# Patient Record
Sex: MALE | Race: WHITE | NOT HISPANIC OR LATINO | Employment: OTHER | URBAN - METROPOLITAN AREA
[De-identification: names, ages, dates, MRNs, and addresses within clinical notes are randomized per-mention and may not be internally consistent; named-entity substitution may affect disease eponyms.]

---

## 2018-01-01 ENCOUNTER — APPOINTMENT (OUTPATIENT)
Dept: RADIOLOGY | Facility: MEDICAL CENTER | Age: 83
DRG: 987 | End: 2018-01-01
Attending: INTERNAL MEDICINE
Payer: MEDICARE

## 2018-01-01 ENCOUNTER — HOME CARE VISIT (OUTPATIENT)
Dept: HOSPICE | Facility: HOSPICE | Age: 83
End: 2018-01-01
Payer: MEDICARE

## 2018-01-01 ENCOUNTER — TELEPHONE (OUTPATIENT)
Dept: MEDICAL GROUP | Facility: MEDICAL CENTER | Age: 83
End: 2018-01-01

## 2018-01-01 ENCOUNTER — OFFICE VISIT (OUTPATIENT)
Dept: MEDICAL GROUP | Facility: MEDICAL CENTER | Age: 83
End: 2018-01-01
Payer: MEDICARE

## 2018-01-01 ENCOUNTER — APPOINTMENT (OUTPATIENT)
Dept: RADIOLOGY | Facility: MEDICAL CENTER | Age: 83
DRG: 987 | End: 2018-01-01
Attending: EMERGENCY MEDICINE
Payer: MEDICARE

## 2018-01-01 ENCOUNTER — APPOINTMENT (OUTPATIENT)
Dept: RADIOLOGY | Facility: MEDICAL CENTER | Age: 83
DRG: 987 | End: 2018-01-01
Attending: HOSPITALIST
Payer: MEDICARE

## 2018-01-01 ENCOUNTER — HOSPITAL ENCOUNTER (OUTPATIENT)
Dept: LAB | Facility: MEDICAL CENTER | Age: 83
End: 2018-01-02
Attending: NURSE PRACTITIONER
Payer: MEDICARE

## 2018-01-01 ENCOUNTER — PATIENT OUTREACH (OUTPATIENT)
Dept: HEALTH INFORMATION MANAGEMENT | Facility: OTHER | Age: 83
End: 2018-01-01

## 2018-01-01 ENCOUNTER — HOSPITAL ENCOUNTER (OUTPATIENT)
Dept: RADIATION ONCOLOGY | Facility: MEDICAL CENTER | Age: 83
End: 2018-02-28
Attending: RADIOLOGY
Payer: MEDICARE

## 2018-01-01 ENCOUNTER — HOSPICE ADMISSION (OUTPATIENT)
Dept: HOSPICE | Facility: HOSPICE | Age: 83
End: 2018-01-01
Payer: MEDICARE

## 2018-01-01 ENCOUNTER — HOSPITAL ENCOUNTER (OUTPATIENT)
Dept: LAB | Facility: MEDICAL CENTER | Age: 83
End: 2018-01-04
Attending: NURSE PRACTITIONER
Payer: MEDICARE

## 2018-01-01 ENCOUNTER — RESOLUTE PROFESSIONAL BILLING HOSPITAL PROF FEE (OUTPATIENT)
Dept: HOSPITALIST | Facility: MEDICAL CENTER | Age: 83
End: 2018-01-01
Payer: MEDICARE

## 2018-01-01 ENCOUNTER — HOSPITAL ENCOUNTER (INPATIENT)
Facility: MEDICAL CENTER | Age: 83
LOS: 12 days | DRG: 987 | End: 2018-02-06
Attending: EMERGENCY MEDICINE | Admitting: HOSPITALIST
Payer: MEDICARE

## 2018-01-01 VITALS
SYSTOLIC BLOOD PRESSURE: 115 MMHG | BODY MASS INDEX: 24.97 KG/M2 | WEIGHT: 215.83 LBS | RESPIRATION RATE: 18 BRPM | TEMPERATURE: 97.4 F | HEIGHT: 78 IN | OXYGEN SATURATION: 92 % | DIASTOLIC BLOOD PRESSURE: 51 MMHG | HEART RATE: 45 BPM

## 2018-01-01 VITALS
SYSTOLIC BLOOD PRESSURE: 102 MMHG | DIASTOLIC BLOOD PRESSURE: 60 MMHG | RESPIRATION RATE: 40 BRPM | HEART RATE: 60 BPM | OXYGEN SATURATION: 96 %

## 2018-01-01 VITALS
DIASTOLIC BLOOD PRESSURE: 60 MMHG | HEART RATE: 60 BPM | SYSTOLIC BLOOD PRESSURE: 102 MMHG | RESPIRATION RATE: 20 BRPM | OXYGEN SATURATION: 96 %

## 2018-01-01 VITALS
SYSTOLIC BLOOD PRESSURE: 140 MMHG | OXYGEN SATURATION: 93 % | HEIGHT: 77 IN | BODY MASS INDEX: 21.8 KG/M2 | TEMPERATURE: 100.2 F | DIASTOLIC BLOOD PRESSURE: 84 MMHG | WEIGHT: 184.6 LBS | RESPIRATION RATE: 44 BRPM | HEART RATE: 86 BPM

## 2018-01-01 VITALS
OXYGEN SATURATION: 94 % | TEMPERATURE: 98.7 F | BODY MASS INDEX: 22.79 KG/M2 | DIASTOLIC BLOOD PRESSURE: 74 MMHG | HEART RATE: 86 BPM | RESPIRATION RATE: 16 BRPM | SYSTOLIC BLOOD PRESSURE: 122 MMHG | WEIGHT: 193 LBS | HEIGHT: 77 IN

## 2018-01-01 VITALS
BODY MASS INDEX: 24.85 KG/M2 | RESPIRATION RATE: 24 BRPM | HEART RATE: 53 BPM | WEIGHT: 215 LBS | OXYGEN SATURATION: 79 %

## 2018-01-01 VITALS — RESPIRATION RATE: 7 BRPM | HEART RATE: 80 BPM

## 2018-01-01 DIAGNOSIS — I31.39 PERICARDIAL EFFUSION: ICD-10-CM

## 2018-01-01 DIAGNOSIS — R63.0 POOR APPETITE: ICD-10-CM

## 2018-01-01 DIAGNOSIS — Z87.440 HISTORY OF UTI: ICD-10-CM

## 2018-01-01 DIAGNOSIS — G95.20 CORD COMPRESSION (HCC): ICD-10-CM

## 2018-01-01 DIAGNOSIS — R33.9 RETENTION OF URINE: ICD-10-CM

## 2018-01-01 DIAGNOSIS — R05.9 COUGH: ICD-10-CM

## 2018-01-01 DIAGNOSIS — R63.4 WEIGHT LOSS, UNINTENTIONAL: ICD-10-CM

## 2018-01-01 DIAGNOSIS — R68.89 COLD INTOLERANCE: ICD-10-CM

## 2018-01-01 DIAGNOSIS — Z91.81 HISTORY OF RECENT FALL: ICD-10-CM

## 2018-01-01 DIAGNOSIS — C79.51 METASTATIC ADENOCARCINOMA TO BONE (HCC): ICD-10-CM

## 2018-01-01 DIAGNOSIS — I26.99 OTHER ACUTE PULMONARY EMBOLISM WITHOUT ACUTE COR PULMONALE (HCC): ICD-10-CM

## 2018-01-01 DIAGNOSIS — R62.7 ADULT FAILURE TO THRIVE: ICD-10-CM

## 2018-01-01 DIAGNOSIS — R09.89 POOR PERIPHERAL CIRCULATION: ICD-10-CM

## 2018-01-01 DIAGNOSIS — R06.02 SHORTNESS OF BREATH: ICD-10-CM

## 2018-01-01 DIAGNOSIS — Z91.81 HISTORY OF FALL: ICD-10-CM

## 2018-01-01 DIAGNOSIS — R53.83 FATIGUE, UNSPECIFIED TYPE: ICD-10-CM

## 2018-01-01 DIAGNOSIS — R07.82 INTERCOSTAL PAIN: ICD-10-CM

## 2018-01-01 LAB
ALBUMIN 24H UR QL ELPH: DETECTED
ALBUMIN SERPL BCP-MCNC: 2.5 G/DL (ref 3.2–4.9)
ALBUMIN SERPL BCP-MCNC: 2.5 G/DL (ref 3.2–4.9)
ALBUMIN SERPL BCP-MCNC: 2.6 G/DL (ref 3.2–4.9)
ALBUMIN SERPL BCP-MCNC: 2.7 G/DL (ref 3.2–4.9)
ALBUMIN SERPL BCP-MCNC: 2.9 G/DL (ref 3.2–4.9)
ALBUMIN SERPL BCP-MCNC: 3 G/DL (ref 3.2–4.9)
ALBUMIN SERPL BCP-MCNC: 3.6 G/DL (ref 3.2–4.9)
ALBUMIN SERPL-MCNC: 2.68 G/DL (ref 3.75–5.01)
ALBUMIN/GLOB SERPL: 0.8 G/DL
ALBUMIN/GLOB SERPL: 0.9 G/DL
ALBUMIN/GLOB SERPL: 0.9 G/DL
ALBUMIN/GLOB SERPL: 1 G/DL
ALP SERPL-CCNC: 103 U/L (ref 30–99)
ALP SERPL-CCNC: 74 U/L (ref 30–99)
ALP SERPL-CCNC: 81 U/L (ref 30–99)
ALP SERPL-CCNC: 82 U/L (ref 30–99)
ALP SERPL-CCNC: 89 U/L (ref 30–99)
ALP SERPL-CCNC: 90 U/L (ref 30–99)
ALP SERPL-CCNC: 99 U/L (ref 30–99)
ALPHA1 GLOB 24H UR QL ELPH: DETECTED
ALPHA1 GLOB SERPL ELPH-MCNC: 0.57 G/DL (ref 0.19–0.46)
ALPHA2 GLOB 24H UR QL ELPH: DETECTED
ALPHA2 GLOB SERPL ELPH-MCNC: 1.02 G/DL (ref 0.48–1.05)
ALT SERPL-CCNC: 10 U/L (ref 2–50)
ALT SERPL-CCNC: 13 U/L (ref 2–50)
ALT SERPL-CCNC: 17 U/L (ref 2–50)
ALT SERPL-CCNC: 19 U/L (ref 2–50)
ALT SERPL-CCNC: 20 U/L (ref 2–50)
ALT SERPL-CCNC: 23 U/L (ref 2–50)
ALT SERPL-CCNC: 38 U/L (ref 2–50)
ANION GAP SERPL CALC-SCNC: 10 MMOL/L (ref 0–11.9)
ANION GAP SERPL CALC-SCNC: 11 MMOL/L (ref 0–11.9)
ANION GAP SERPL CALC-SCNC: 11 MMOL/L (ref 0–11.9)
ANION GAP SERPL CALC-SCNC: 6 MMOL/L (ref 0–11.9)
ANION GAP SERPL CALC-SCNC: 7 MMOL/L (ref 0–11.9)
ANION GAP SERPL CALC-SCNC: 8 MMOL/L (ref 0–11.9)
ANION GAP SERPL CALC-SCNC: 9 MMOL/L (ref 0–11.9)
APPEARANCE UR: CLEAR
APPEARANCE UR: CLEAR
APTT PPP: 101.8 SEC (ref 24.7–36)
APTT PPP: 105.7 SEC (ref 24.7–36)
APTT PPP: 114.6 SEC (ref 24.7–36)
APTT PPP: 41.1 SEC (ref 24.7–36)
APTT PPP: 49 SEC (ref 24.7–36)
APTT PPP: 50.7 SEC (ref 24.7–36)
APTT PPP: 52.8 SEC (ref 24.7–36)
APTT PPP: 55.4 SEC (ref 24.7–36)
APTT PPP: 65.3 SEC (ref 24.7–36)
APTT PPP: 70.5 SEC (ref 24.7–36)
APTT PPP: 70.7 SEC (ref 24.7–36)
APTT PPP: 77.3 SEC (ref 24.7–36)
APTT PPP: 78 SEC (ref 24.7–36)
APTT PPP: 80.2 SEC (ref 24.7–36)
APTT PPP: 83.6 SEC (ref 24.7–36)
APTT PPP: 91.4 SEC (ref 24.7–36)
APTT PPP: 97.8 SEC (ref 24.7–36)
AST SERPL-CCNC: 15 U/L (ref 12–45)
AST SERPL-CCNC: 20 U/L (ref 12–45)
AST SERPL-CCNC: 20 U/L (ref 12–45)
AST SERPL-CCNC: 27 U/L (ref 12–45)
AST SERPL-CCNC: 28 U/L (ref 12–45)
AST SERPL-CCNC: 29 U/L (ref 12–45)
AST SERPL-CCNC: 29 U/L (ref 12–45)
B-GLOBULIN SERPL ELPH-MCNC: 0.9 G/DL (ref 0.48–1.1)
B-GLOBULIN UR QL ELPH: DETECTED
BACTERIA #/AREA URNS HPF: ABNORMAL /HPF
BACTERIA #/AREA URNS HPF: NEGATIVE /HPF
BACTERIA BLD CULT: NORMAL
BACTERIA BLD CULT: NORMAL
BACTERIA UR CULT: ABNORMAL
BACTERIA UR CULT: ABNORMAL
BACTERIA UR CULT: NORMAL
BASOPHILS # BLD AUTO: 0.1 % (ref 0–1.8)
BASOPHILS # BLD AUTO: 0.1 % (ref 0–1.8)
BASOPHILS # BLD AUTO: 0.2 % (ref 0–1.8)
BASOPHILS # BLD AUTO: 0.2 % (ref 0–1.8)
BASOPHILS # BLD AUTO: 0.3 % (ref 0–1.8)
BASOPHILS # BLD AUTO: 0.3 % (ref 0–1.8)
BASOPHILS # BLD AUTO: 0.4 % (ref 0–1.8)
BASOPHILS # BLD AUTO: 0.6 % (ref 0–1.8)
BASOPHILS # BLD: 0.01 K/UL (ref 0–0.12)
BASOPHILS # BLD: 0.02 K/UL (ref 0–0.12)
BASOPHILS # BLD: 0.03 K/UL (ref 0–0.12)
BASOPHILS # BLD: 0.04 K/UL (ref 0–0.12)
BASOPHILS # BLD: 0.04 K/UL (ref 0–0.12)
BASOPHILS # BLD: 0.05 K/UL (ref 0–0.12)
BASOPHILS # BLD: 0.05 K/UL (ref 0–0.12)
BASOPHILS # BLD: 0.07 K/UL (ref 0–0.12)
BILIRUB SERPL-MCNC: 0.5 MG/DL (ref 0.1–1.5)
BILIRUB SERPL-MCNC: 0.5 MG/DL (ref 0.1–1.5)
BILIRUB SERPL-MCNC: 0.6 MG/DL (ref 0.1–1.5)
BILIRUB SERPL-MCNC: 0.6 MG/DL (ref 0.1–1.5)
BILIRUB SERPL-MCNC: 0.7 MG/DL (ref 0.1–1.5)
BILIRUB SERPL-MCNC: 0.9 MG/DL (ref 0.1–1.5)
BILIRUB SERPL-MCNC: 0.9 MG/DL (ref 0.1–1.5)
BILIRUB UR QL STRIP.AUTO: NEGATIVE
BILIRUB UR QL STRIP.AUTO: NEGATIVE
BNP SERPL-MCNC: 350 PG/ML (ref 0–100)
BUN SERPL-MCNC: 18 MG/DL (ref 8–22)
BUN SERPL-MCNC: 19 MG/DL (ref 8–22)
BUN SERPL-MCNC: 25 MG/DL (ref 8–22)
BUN SERPL-MCNC: 26 MG/DL (ref 8–22)
BUN SERPL-MCNC: 29 MG/DL (ref 8–22)
BUN SERPL-MCNC: 31 MG/DL (ref 8–22)
BUN SERPL-MCNC: 34 MG/DL (ref 8–22)
BUN SERPL-MCNC: 37 MG/DL (ref 8–22)
BUN SERPL-MCNC: 40 MG/DL (ref 8–22)
CALCIUM SERPL-MCNC: 7.5 MG/DL (ref 8.5–10.5)
CALCIUM SERPL-MCNC: 7.8 MG/DL (ref 8.5–10.5)
CALCIUM SERPL-MCNC: 8 MG/DL (ref 8.5–10.5)
CALCIUM SERPL-MCNC: 8.3 MG/DL (ref 8.5–10.5)
CALCIUM SERPL-MCNC: 8.3 MG/DL (ref 8.5–10.5)
CALCIUM SERPL-MCNC: 8.6 MG/DL (ref 8.5–10.5)
CALCIUM SERPL-MCNC: 8.6 MG/DL (ref 8.5–10.5)
CALCIUM SERPL-MCNC: 9 MG/DL (ref 8.5–10.5)
CALCIUM SERPL-MCNC: 9.2 MG/DL (ref 8.5–10.5)
CHLORIDE SERPL-SCNC: 103 MMOL/L (ref 96–112)
CHLORIDE SERPL-SCNC: 105 MMOL/L (ref 96–112)
CHLORIDE SERPL-SCNC: 106 MMOL/L (ref 96–112)
CHLORIDE SERPL-SCNC: 106 MMOL/L (ref 96–112)
CHLORIDE SERPL-SCNC: 107 MMOL/L (ref 96–112)
CHLORIDE SERPL-SCNC: 110 MMOL/L (ref 96–112)
CHOLEST SERPL-MCNC: 101 MG/DL (ref 100–199)
CO2 SERPL-SCNC: 20 MMOL/L (ref 20–33)
CO2 SERPL-SCNC: 22 MMOL/L (ref 20–33)
CO2 SERPL-SCNC: 24 MMOL/L (ref 20–33)
CO2 SERPL-SCNC: 25 MMOL/L (ref 20–33)
COLLECT DURATION TIME SPEC: ABNORMAL H
COLOR UR: ABNORMAL
COLOR UR: YELLOW
CREAT SERPL-MCNC: 0.86 MG/DL (ref 0.5–1.4)
CREAT SERPL-MCNC: 0.88 MG/DL (ref 0.5–1.4)
CREAT SERPL-MCNC: 0.95 MG/DL (ref 0.5–1.4)
CREAT SERPL-MCNC: 0.99 MG/DL (ref 0.5–1.4)
CREAT SERPL-MCNC: 1.07 MG/DL (ref 0.5–1.4)
CREAT SERPL-MCNC: 1.15 MG/DL (ref 0.5–1.4)
CREAT SERPL-MCNC: 1.19 MG/DL (ref 0.5–1.4)
CREAT SERPL-MCNC: 1.56 MG/DL (ref 0.5–1.4)
CREAT SERPL-MCNC: 1.62 MG/DL (ref 0.5–1.4)
CRP SERPL HS-MCNC: 12 MG/DL (ref 0–0.75)
CULTURE IF INDICATED INDCX: YES UA CULTURE
EKG IMPRESSION: NORMAL
EOSINOPHIL # BLD AUTO: 0 K/UL (ref 0–0.51)
EOSINOPHIL # BLD AUTO: 0.01 K/UL (ref 0–0.51)
EOSINOPHIL # BLD AUTO: 0.02 K/UL (ref 0–0.51)
EOSINOPHIL # BLD AUTO: 0.17 K/UL (ref 0–0.51)
EOSINOPHIL # BLD AUTO: 0.3 K/UL (ref 0–0.51)
EOSINOPHIL # BLD AUTO: 0.34 K/UL (ref 0–0.51)
EOSINOPHIL NFR BLD: 0 % (ref 0–6.9)
EOSINOPHIL NFR BLD: 0.1 % (ref 0–6.9)
EOSINOPHIL NFR BLD: 0.1 % (ref 0–6.9)
EOSINOPHIL NFR BLD: 1.2 % (ref 0–6.9)
EOSINOPHIL NFR BLD: 2.5 % (ref 0–6.9)
EOSINOPHIL NFR BLD: 2.9 % (ref 0–6.9)
EPI CELLS #/AREA URNS HPF: ABNORMAL /HPF
EPI CELLS #/AREA URNS HPF: ABNORMAL /HPF
ERYTHROCYTE [DISTWIDTH] IN BLOOD BY AUTOMATED COUNT: 46.8 FL (ref 35.9–50)
ERYTHROCYTE [DISTWIDTH] IN BLOOD BY AUTOMATED COUNT: 47.8 FL (ref 35.9–50)
ERYTHROCYTE [DISTWIDTH] IN BLOOD BY AUTOMATED COUNT: 47.9 FL (ref 35.9–50)
ERYTHROCYTE [DISTWIDTH] IN BLOOD BY AUTOMATED COUNT: 48 FL (ref 35.9–50)
ERYTHROCYTE [DISTWIDTH] IN BLOOD BY AUTOMATED COUNT: 48.3 FL (ref 35.9–50)
ERYTHROCYTE [DISTWIDTH] IN BLOOD BY AUTOMATED COUNT: 48.3 FL (ref 35.9–50)
ERYTHROCYTE [DISTWIDTH] IN BLOOD BY AUTOMATED COUNT: 48.7 FL (ref 35.9–50)
ERYTHROCYTE [DISTWIDTH] IN BLOOD BY AUTOMATED COUNT: 49.5 FL (ref 35.9–50)
ERYTHROCYTE [SEDIMENTATION RATE] IN BLOOD BY WESTERGREN METHOD: 34 MM/HOUR (ref 0–20)
ERYTHROCYTE [SEDIMENTATION RATE] IN BLOOD BY WESTERGREN METHOD: 38 MM/HOUR (ref 0–20)
FLUAV RNA SPEC QL NAA+PROBE: NEGATIVE
FLUBV RNA SPEC QL NAA+PROBE: NEGATIVE
FOLATE SERPL-MCNC: 11.7 NG/ML
GAMMA GLOB SERPL ELPH-MCNC: 1.13 G/DL (ref 0.62–1.51)
GAMMA GLOB UR QL ELPH: DETECTED
GFR SERPL CREATININE-BSD FRML MDRD: 40 ML/MIN/1.73 M 2
GLOBULIN SER CALC-MCNC: 2.6 G/DL (ref 1.9–3.5)
GLOBULIN SER CALC-MCNC: 3.2 G/DL (ref 1.9–3.5)
GLOBULIN SER CALC-MCNC: 3.2 G/DL (ref 1.9–3.5)
GLOBULIN SER CALC-MCNC: 3.3 G/DL (ref 1.9–3.5)
GLOBULIN SER CALC-MCNC: 3.3 G/DL (ref 1.9–3.5)
GLOBULIN SER CALC-MCNC: 3.6 G/DL (ref 1.9–3.5)
GLOBULIN SER CALC-MCNC: 3.8 G/DL (ref 1.9–3.5)
GLUCOSE SERPL-MCNC: 103 MG/DL (ref 65–99)
GLUCOSE SERPL-MCNC: 106 MG/DL (ref 65–99)
GLUCOSE SERPL-MCNC: 106 MG/DL (ref 65–99)
GLUCOSE SERPL-MCNC: 118 MG/DL (ref 65–99)
GLUCOSE SERPL-MCNC: 124 MG/DL (ref 65–99)
GLUCOSE SERPL-MCNC: 132 MG/DL (ref 65–99)
GLUCOSE SERPL-MCNC: 135 MG/DL (ref 65–99)
GLUCOSE SERPL-MCNC: 96 MG/DL (ref 65–99)
GLUCOSE SERPL-MCNC: 97 MG/DL (ref 65–99)
GLUCOSE UR STRIP.AUTO-MCNC: NEGATIVE MG/DL
GLUCOSE UR STRIP.AUTO-MCNC: NEGATIVE MG/DL
HCT VFR BLD AUTO: 37.3 % (ref 42–52)
HCT VFR BLD AUTO: 40.3 % (ref 42–52)
HCT VFR BLD AUTO: 40.4 % (ref 42–52)
HCT VFR BLD AUTO: 40.8 % (ref 42–52)
HCT VFR BLD AUTO: 40.8 % (ref 42–52)
HCT VFR BLD AUTO: 41.1 % (ref 42–52)
HCT VFR BLD AUTO: 41.2 % (ref 42–52)
HCT VFR BLD AUTO: 42.8 % (ref 42–52)
HCT VFR BLD AUTO: 44.1 % (ref 42–52)
HCT VFR BLD AUTO: 45.4 % (ref 42–52)
HDLC SERPL-MCNC: 42 MG/DL
HGB BLD-MCNC: 12.5 G/DL (ref 14–18)
HGB BLD-MCNC: 12.9 G/DL (ref 14–18)
HGB BLD-MCNC: 13.1 G/DL (ref 14–18)
HGB BLD-MCNC: 13.1 G/DL (ref 14–18)
HGB BLD-MCNC: 13.3 G/DL (ref 14–18)
HGB BLD-MCNC: 13.4 G/DL (ref 14–18)
HGB BLD-MCNC: 13.8 G/DL (ref 14–18)
HGB BLD-MCNC: 14 G/DL (ref 14–18)
HGB BLD-MCNC: 14.5 G/DL (ref 14–18)
HGB BLD-MCNC: 15.3 G/DL (ref 14–18)
HYALINE CASTS #/AREA URNS LPF: ABNORMAL /LPF
HYALINE CASTS #/AREA URNS LPF: ABNORMAL /LPF
IMM GRANULOCYTES # BLD AUTO: 0.06 K/UL (ref 0–0.11)
IMM GRANULOCYTES # BLD AUTO: 0.08 K/UL (ref 0–0.11)
IMM GRANULOCYTES # BLD AUTO: 0.08 K/UL (ref 0–0.11)
IMM GRANULOCYTES # BLD AUTO: 0.1 K/UL (ref 0–0.11)
IMM GRANULOCYTES # BLD AUTO: 0.1 K/UL (ref 0–0.11)
IMM GRANULOCYTES # BLD AUTO: 0.11 K/UL (ref 0–0.11)
IMM GRANULOCYTES # BLD AUTO: 0.15 K/UL (ref 0–0.11)
IMM GRANULOCYTES # BLD AUTO: 0.3 K/UL (ref 0–0.11)
IMM GRANULOCYTES NFR BLD AUTO: 0.5 % (ref 0–0.9)
IMM GRANULOCYTES NFR BLD AUTO: 0.6 % (ref 0–0.9)
IMM GRANULOCYTES NFR BLD AUTO: 0.7 % (ref 0–0.9)
IMM GRANULOCYTES NFR BLD AUTO: 0.7 % (ref 0–0.9)
IMM GRANULOCYTES NFR BLD AUTO: 0.8 % (ref 0–0.9)
IMM GRANULOCYTES NFR BLD AUTO: 1.8 % (ref 0–0.9)
INR PPP: 1.2 (ref 0.87–1.13)
INTERPRETATION SERPL IFE-IMP: ABNORMAL
INTERPRETATION UR IFE-IMP: ABNORMAL
KAPPA LC FREE UR-MCNC: 56.2 MG/DL (ref 0.14–2.42)
KAPPA LC FREE/LAMBDA FREE UR: 11.54 RATIO (ref 2.04–10.37)
KETONES UR STRIP.AUTO-MCNC: ABNORMAL MG/DL
KETONES UR STRIP.AUTO-MCNC: NEGATIVE MG/DL
LACTATE BLD-SCNC: 1.5 MMOL/L (ref 0.5–2)
LACTATE BLD-SCNC: 1.9 MMOL/L (ref 0.5–2)
LAMBDA LC FREE UR-MCNC: 4.87 MG/DL (ref 0.02–0.67)
LDLC SERPL CALC-MCNC: 48 MG/DL
LEUKOCYTE ESTERASE UR QL STRIP.AUTO: ABNORMAL
LEUKOCYTE ESTERASE UR QL STRIP.AUTO: ABNORMAL
LV EJECT FRACT  99904: 50
LV EJECT FRACT MOD 2C 99903: 54.74
LV EJECT FRACT MOD 4C 99902: 46.1
LV EJECT FRACT MOD BP 99901: 48.35
LYMPHOCYTES # BLD AUTO: 0.28 K/UL (ref 1–4.8)
LYMPHOCYTES # BLD AUTO: 0.43 K/UL (ref 1–4.8)
LYMPHOCYTES # BLD AUTO: 0.65 K/UL (ref 1–4.8)
LYMPHOCYTES # BLD AUTO: 0.87 K/UL (ref 1–4.8)
LYMPHOCYTES # BLD AUTO: 0.89 K/UL (ref 1–4.8)
LYMPHOCYTES # BLD AUTO: 1.21 K/UL (ref 1–4.8)
LYMPHOCYTES # BLD AUTO: 1.25 K/UL (ref 1–4.8)
LYMPHOCYTES # BLD AUTO: 1.92 K/UL (ref 1–4.8)
LYMPHOCYTES NFR BLD: 1.7 % (ref 22–41)
LYMPHOCYTES NFR BLD: 10 % (ref 22–41)
LYMPHOCYTES NFR BLD: 10.8 % (ref 22–41)
LYMPHOCYTES NFR BLD: 13.6 % (ref 22–41)
LYMPHOCYTES NFR BLD: 2.3 % (ref 22–41)
LYMPHOCYTES NFR BLD: 5.3 % (ref 22–41)
LYMPHOCYTES NFR BLD: 6.6 % (ref 22–41)
LYMPHOCYTES NFR BLD: 6.7 % (ref 22–41)
MAGNESIUM SERPL-MCNC: 2 MG/DL (ref 1.5–2.5)
MAGNESIUM SERPL-MCNC: 2.1 MG/DL (ref 1.5–2.5)
MAGNESIUM SERPL-MCNC: 2.2 MG/DL (ref 1.5–2.5)
MCH RBC QN AUTO: 29 PG (ref 27–33)
MCH RBC QN AUTO: 29.1 PG (ref 27–33)
MCH RBC QN AUTO: 29.2 PG (ref 27–33)
MCH RBC QN AUTO: 29.3 PG (ref 27–33)
MCH RBC QN AUTO: 29.5 PG (ref 27–33)
MCH RBC QN AUTO: 29.9 PG (ref 27–33)
MCH RBC QN AUTO: 30 PG (ref 27–33)
MCH RBC QN AUTO: 30 PG (ref 27–33)
MCH RBC QN AUTO: 30.5 PG (ref 27–33)
MCH RBC QN AUTO: 30.6 PG (ref 27–33)
MCHC RBC AUTO-ENTMCNC: 31.9 G/DL (ref 33.7–35.3)
MCHC RBC AUTO-ENTMCNC: 31.9 G/DL (ref 33.7–35.3)
MCHC RBC AUTO-ENTMCNC: 32.1 G/DL (ref 33.7–35.3)
MCHC RBC AUTO-ENTMCNC: 32.3 G/DL (ref 33.7–35.3)
MCHC RBC AUTO-ENTMCNC: 32.7 G/DL (ref 33.7–35.3)
MCHC RBC AUTO-ENTMCNC: 32.9 G/DL (ref 33.7–35.3)
MCHC RBC AUTO-ENTMCNC: 33.3 G/DL (ref 33.7–35.3)
MCHC RBC AUTO-ENTMCNC: 33.5 G/DL (ref 33.7–35.3)
MCHC RBC AUTO-ENTMCNC: 33.7 G/DL (ref 33.7–35.3)
MCHC RBC AUTO-ENTMCNC: 33.8 G/DL (ref 33.7–35.3)
MCV RBC AUTO: 89 FL (ref 81.4–97.8)
MCV RBC AUTO: 89.7 FL (ref 81.4–97.8)
MCV RBC AUTO: 90 FL (ref 81.4–97.8)
MCV RBC AUTO: 90.1 FL (ref 81.4–97.8)
MCV RBC AUTO: 90.3 FL (ref 81.4–97.8)
MCV RBC AUTO: 90.4 FL (ref 81.4–97.8)
MCV RBC AUTO: 90.9 FL (ref 81.4–97.8)
MCV RBC AUTO: 91 FL (ref 81.4–97.8)
MCV RBC AUTO: 91.3 FL (ref 81.4–97.8)
MCV RBC AUTO: 93 FL (ref 81.4–97.8)
MICRO URNS: ABNORMAL
MICRO URNS: ABNORMAL
MONOCLON BAND OBS SERPL: ABNORMAL
MONOCYTES # BLD AUTO: 0.23 K/UL (ref 0–0.85)
MONOCYTES # BLD AUTO: 0.4 K/UL (ref 0–0.85)
MONOCYTES # BLD AUTO: 0.59 K/UL (ref 0–0.85)
MONOCYTES # BLD AUTO: 1.23 K/UL (ref 0–0.85)
MONOCYTES # BLD AUTO: 1.36 K/UL (ref 0–0.85)
MONOCYTES # BLD AUTO: 1.4 K/UL (ref 0–0.85)
MONOCYTES # BLD AUTO: 1.58 K/UL (ref 0–0.85)
MONOCYTES # BLD AUTO: 1.61 K/UL (ref 0–0.85)
MONOCYTES NFR BLD AUTO: 10.4 % (ref 0–13.4)
MONOCYTES NFR BLD AUTO: 10.6 % (ref 0–13.4)
MONOCYTES NFR BLD AUTO: 11.2 % (ref 0–13.4)
MONOCYTES NFR BLD AUTO: 11.4 % (ref 0–13.4)
MONOCYTES NFR BLD AUTO: 2.2 % (ref 0–13.4)
MONOCYTES NFR BLD AUTO: 2.4 % (ref 0–13.4)
MONOCYTES NFR BLD AUTO: 3.6 % (ref 0–13.4)
MONOCYTES NFR BLD AUTO: 9.5 % (ref 0–13.4)
NEUTROPHILS # BLD AUTO: 10.23 K/UL (ref 1.82–7.42)
NEUTROPHILS # BLD AUTO: 11.06 K/UL (ref 1.82–7.42)
NEUTROPHILS # BLD AUTO: 13.94 K/UL (ref 1.82–7.42)
NEUTROPHILS # BLD AUTO: 15.36 K/UL (ref 1.82–7.42)
NEUTROPHILS # BLD AUTO: 17.29 K/UL (ref 1.82–7.42)
NEUTROPHILS # BLD AUTO: 8.63 K/UL (ref 1.82–7.42)
NEUTROPHILS # BLD AUTO: 8.66 K/UL (ref 1.82–7.42)
NEUTROPHILS # BLD AUTO: 9.1 K/UL (ref 1.82–7.42)
NEUTROPHILS NFR BLD: 72.8 % (ref 44–72)
NEUTROPHILS NFR BLD: 74.8 % (ref 44–72)
NEUTROPHILS NFR BLD: 75 % (ref 44–72)
NEUTROPHILS NFR BLD: 82 % (ref 44–72)
NEUTROPHILS NFR BLD: 84.2 % (ref 44–72)
NEUTROPHILS NFR BLD: 90 % (ref 44–72)
NEUTROPHILS NFR BLD: 92.7 % (ref 44–72)
NEUTROPHILS NFR BLD: 94.5 % (ref 44–72)
NITRITE UR QL STRIP.AUTO: POSITIVE
NITRITE UR QL STRIP.AUTO: POSITIVE
NRBC # BLD AUTO: 0 K/UL
NRBC BLD-RTO: 0 /100 WBC
PATHOLOGY STUDY: ABNORMAL
PH UR STRIP.AUTO: 5 [PH]
PH UR STRIP.AUTO: 5 [PH]
PHOSPHATE SERPL-MCNC: 2.8 MG/DL (ref 2.5–4.5)
PHOSPHATE SERPL-MCNC: 3.1 MG/DL (ref 2.5–4.5)
PHOSPHATE SERPL-MCNC: 3.3 MG/DL (ref 2.5–4.5)
PHOSPHATE SERPL-MCNC: 4.1 MG/DL (ref 2.5–4.5)
PLATELET # BLD AUTO: 225 K/UL (ref 164–446)
PLATELET # BLD AUTO: 256 K/UL (ref 164–446)
PLATELET # BLD AUTO: 262 K/UL (ref 164–446)
PLATELET # BLD AUTO: 285 K/UL (ref 164–446)
PLATELET # BLD AUTO: 296 K/UL (ref 164–446)
PLATELET # BLD AUTO: 298 K/UL (ref 164–446)
PLATELET # BLD AUTO: 299 K/UL (ref 164–446)
PLATELET # BLD AUTO: 311 K/UL (ref 164–446)
PLATELET # BLD AUTO: 366 K/UL (ref 164–446)
PLATELET # BLD AUTO: 369 K/UL (ref 164–446)
PMV BLD AUTO: 10 FL (ref 9–12.9)
PMV BLD AUTO: 10 FL (ref 9–12.9)
PMV BLD AUTO: 10.2 FL (ref 9–12.9)
PMV BLD AUTO: 10.4 FL (ref 9–12.9)
PMV BLD AUTO: 10.5 FL (ref 9–12.9)
PMV BLD AUTO: 11.1 FL (ref 9–12.9)
PMV BLD AUTO: 9.9 FL (ref 9–12.9)
PMV BLD AUTO: 9.9 FL (ref 9–12.9)
POTASSIUM SERPL-SCNC: 3.7 MMOL/L (ref 3.6–5.5)
POTASSIUM SERPL-SCNC: 3.7 MMOL/L (ref 3.6–5.5)
POTASSIUM SERPL-SCNC: 3.9 MMOL/L (ref 3.6–5.5)
POTASSIUM SERPL-SCNC: 3.9 MMOL/L (ref 3.6–5.5)
POTASSIUM SERPL-SCNC: 4 MMOL/L (ref 3.6–5.5)
POTASSIUM SERPL-SCNC: 4.3 MMOL/L (ref 3.6–5.5)
PROCALCITONIN SERPL-MCNC: 0.13 NG/ML
PROT 24H UR-MRATE: ABNORMAL MG/D (ref 10–140)
PROT SERPL-MCNC: 5.3 G/DL (ref 6–8.2)
PROT SERPL-MCNC: 5.7 G/DL (ref 6–8.2)
PROT SERPL-MCNC: 5.8 G/DL (ref 6–8.2)
PROT SERPL-MCNC: 5.9 G/DL (ref 6–8.2)
PROT SERPL-MCNC: 6.1 G/DL (ref 6–8.2)
PROT SERPL-MCNC: 6.3 G/DL (ref 6–8.3)
PROT SERPL-MCNC: 6.6 G/DL (ref 6–8.2)
PROT SERPL-MCNC: 7.4 G/DL (ref 6–8.2)
PROT UR QL STRIP: 30 MG/DL
PROT UR QL STRIP: NEGATIVE MG/DL
PROTHROMBIN TIME: 14.9 SEC (ref 12–14.6)
PSA SERPL-MCNC: 0.31 NG/ML (ref 0–4)
RBC # BLD AUTO: 4.16 M/UL (ref 4.7–6.1)
RBC # BLD AUTO: 4.44 M/UL (ref 4.7–6.1)
RBC # BLD AUTO: 4.47 M/UL (ref 4.7–6.1)
RBC # BLD AUTO: 4.48 M/UL (ref 4.7–6.1)
RBC # BLD AUTO: 4.52 M/UL (ref 4.7–6.1)
RBC # BLD AUTO: 4.52 M/UL (ref 4.7–6.1)
RBC # BLD AUTO: 4.56 M/UL (ref 4.7–6.1)
RBC # BLD AUTO: 4.74 M/UL (ref 4.7–6.1)
RBC # BLD AUTO: 4.75 M/UL (ref 4.7–6.1)
RBC # BLD AUTO: 5.1 M/UL (ref 4.7–6.1)
RBC # URNS HPF: ABNORMAL /HPF
RBC UR QL AUTO: ABNORMAL
RBC UR QL AUTO: NEGATIVE
SIGNIFICANT IND 70042: ABNORMAL
SIGNIFICANT IND 70042: NORMAL
SITE SITE: ABNORMAL
SITE SITE: NORMAL
SODIUM SERPL-SCNC: 135 MMOL/L (ref 135–145)
SODIUM SERPL-SCNC: 136 MMOL/L (ref 135–145)
SODIUM SERPL-SCNC: 137 MMOL/L (ref 135–145)
SODIUM SERPL-SCNC: 139 MMOL/L (ref 135–145)
SOURCE SOURCE: ABNORMAL
SOURCE SOURCE: NORMAL
SP GR UR STRIP.AUTO: 1.02
SP GR UR STRIP.AUTO: 1.03
TOTAL VOLUME 1105: ABNORMAL ML
TRIGL SERPL-MCNC: 57 MG/DL (ref 0–149)
TROPONIN I SERPL-MCNC: 0.15 NG/ML (ref 0–0.04)
TROPONIN I SERPL-MCNC: 0.24 NG/ML (ref 0–0.04)
TROPONIN I SERPL-MCNC: 0.25 NG/ML (ref 0–0.04)
TROPONIN I SERPL-MCNC: 0.26 NG/ML (ref 0–0.04)
TSH SERPL DL<=0.005 MIU/L-ACNC: 1.36 UIU/ML (ref 0.38–5.33)
TSH SERPL DL<=0.005 MIU/L-ACNC: 1.59 UIU/ML (ref 0.38–5.33)
UROBILINOGEN UR STRIP.AUTO-MCNC: 0.2 MG/DL
UROBILINOGEN UR STRIP.AUTO-MCNC: 0.2 MG/DL
VIT B12 SERPL-MCNC: 380 PG/ML (ref 211–911)
WBC # BLD AUTO: 11.2 K/UL (ref 4.8–10.8)
WBC # BLD AUTO: 11.6 K/UL (ref 4.8–10.8)
WBC # BLD AUTO: 12.1 K/UL (ref 4.8–10.8)
WBC # BLD AUTO: 13.5 K/UL (ref 4.8–10.8)
WBC # BLD AUTO: 14.1 K/UL (ref 4.8–10.8)
WBC # BLD AUTO: 16.6 K/UL (ref 4.8–10.8)
WBC # BLD AUTO: 16.6 K/UL (ref 4.8–10.8)
WBC # BLD AUTO: 18.3 K/UL (ref 4.8–10.8)
WBC # BLD AUTO: 22 K/UL (ref 4.8–10.8)
WBC # BLD AUTO: 9.6 K/UL (ref 4.8–10.8)
WBC #/AREA URNS HPF: ABNORMAL /HPF
WBC #/AREA URNS HPF: ABNORMAL /HPF

## 2018-01-01 PROCEDURE — 84100 ASSAY OF PHOSPHORUS: CPT

## 2018-01-01 PROCEDURE — 99232 SBSQ HOSP IP/OBS MODERATE 35: CPT | Performed by: INTERNAL MEDICINE

## 2018-01-01 PROCEDURE — 700102 HCHG RX REV CODE 250 W/ 637 OVERRIDE(OP): Performed by: INTERNAL MEDICINE

## 2018-01-01 PROCEDURE — 85730 THROMBOPLASTIN TIME PARTIAL: CPT

## 2018-01-01 PROCEDURE — 700102 HCHG RX REV CODE 250 W/ 637 OVERRIDE(OP): Performed by: HOSPITALIST

## 2018-01-01 PROCEDURE — 83735 ASSAY OF MAGNESIUM: CPT

## 2018-01-01 PROCEDURE — 92526 ORAL FUNCTION THERAPY: CPT

## 2018-01-01 PROCEDURE — 0QB03ZX EXCISION OF LUMBAR VERTEBRA, PERCUTANEOUS APPROACH, DIAGNOSTIC: ICD-10-PCS | Performed by: RADIOLOGY

## 2018-01-01 PROCEDURE — 92610 EVALUATE SWALLOWING FUNCTION: CPT

## 2018-01-01 PROCEDURE — 700111 HCHG RX REV CODE 636 W/ 250 OVERRIDE (IP): Performed by: RADIOLOGY

## 2018-01-01 PROCEDURE — 77387 GUIDANCE FOR RADJ TX DLVR: CPT | Performed by: RADIOLOGY

## 2018-01-01 PROCEDURE — A9270 NON-COVERED ITEM OR SERVICE: HCPCS | Performed by: INTERNAL MEDICINE

## 2018-01-01 PROCEDURE — 99356 PR PROLONGED SVC I/P OR OBS SETTING 1ST HOUR: CPT | Performed by: INTERNAL MEDICINE

## 2018-01-01 PROCEDURE — 93880 EXTRACRANIAL BILAT STUDY: CPT

## 2018-01-01 PROCEDURE — G0155 HHCP-SVS OF CSW,EA 15 MIN: HCPCS

## 2018-01-01 PROCEDURE — 36415 COLL VENOUS BLD VENIPUNCTURE: CPT

## 2018-01-01 PROCEDURE — 83883 ASSAY NEPHELOMETRY NOT SPEC: CPT | Mod: 91

## 2018-01-01 PROCEDURE — 77280 THER RAD SIMULAJ FIELD SMPL: CPT | Mod: 26 | Performed by: RADIOLOGY

## 2018-01-01 PROCEDURE — 97165 OT EVAL LOW COMPLEX 30 MIN: CPT

## 2018-01-01 PROCEDURE — 99239 HOSP IP/OBS DSCHRG MGMT >30: CPT | Performed by: HOSPITALIST

## 2018-01-01 PROCEDURE — A9270 NON-COVERED ITEM OR SERVICE: HCPCS | Performed by: HOSPITALIST

## 2018-01-01 PROCEDURE — 94760 N-INVAS EAR/PLS OXIMETRY 1: CPT

## 2018-01-01 PROCEDURE — 84160 ASSAY OF PROTEIN ANY SOURCE: CPT

## 2018-01-01 PROCEDURE — 80061 LIPID PANEL: CPT

## 2018-01-01 PROCEDURE — 77300 RADIATION THERAPY DOSE PLAN: CPT | Mod: 26 | Performed by: RADIOLOGY

## 2018-01-01 PROCEDURE — 71100 X-RAY EXAM RIBS UNI 2 VIEWS: CPT

## 2018-01-01 PROCEDURE — A4520 INCONTINENCE GARMENT ANYTYPE: HCPCS

## 2018-01-01 PROCEDURE — 71250 CT THORAX DX C-: CPT

## 2018-01-01 PROCEDURE — 99285 EMERGENCY DEPT VISIT HI MDM: CPT

## 2018-01-01 PROCEDURE — 85025 COMPLETE CBC W/AUTO DIFF WBC: CPT

## 2018-01-01 PROCEDURE — 83880 ASSAY OF NATRIURETIC PEPTIDE: CPT

## 2018-01-01 PROCEDURE — 70450 CT HEAD/BRAIN W/O DYE: CPT

## 2018-01-01 PROCEDURE — 77307 TELETHX ISODOSE PLAN CPLX: CPT | Mod: 26 | Performed by: RADIOLOGY

## 2018-01-01 PROCEDURE — 20225 BONE BIOPSY TROCAR/NDL DEEP: CPT

## 2018-01-01 PROCEDURE — S9126 HOSPICE CARE, IN THE HOME, P: HCPCS

## 2018-01-01 PROCEDURE — 81001 URINALYSIS AUTO W/SCOPE: CPT

## 2018-01-01 PROCEDURE — 99233 SBSQ HOSP IP/OBS HIGH 50: CPT | Performed by: INTERNAL MEDICINE

## 2018-01-01 PROCEDURE — 71045 X-RAY EXAM CHEST 1 VIEW: CPT

## 2018-01-01 PROCEDURE — 80048 BASIC METABOLIC PNL TOTAL CA: CPT

## 2018-01-01 PROCEDURE — 85652 RBC SED RATE AUTOMATED: CPT

## 2018-01-01 PROCEDURE — 80053 COMPREHEN METABOLIC PANEL: CPT

## 2018-01-01 PROCEDURE — 77412 RADIATION TX DELIVERY LVL 3: CPT | Performed by: RADIOLOGY

## 2018-01-01 PROCEDURE — 87077 CULTURE AEROBIC IDENTIFY: CPT

## 2018-01-01 PROCEDURE — G0156 HHCP-SVS OF AIDE,EA 15 MIN: HCPCS

## 2018-01-01 PROCEDURE — 99232 SBSQ HOSP IP/OBS MODERATE 35: CPT | Performed by: HOSPITALIST

## 2018-01-01 PROCEDURE — 84484 ASSAY OF TROPONIN QUANT: CPT

## 2018-01-01 PROCEDURE — 700111 HCHG RX REV CODE 636 W/ 250 OVERRIDE (IP): Performed by: INTERNAL MEDICINE

## 2018-01-01 PROCEDURE — A4338 INDWELLING CATHETER LATEX: HCPCS | Performed by: INTERNAL MEDICINE

## 2018-01-01 PROCEDURE — 94667 MNPJ CHEST WALL 1ST: CPT

## 2018-01-01 PROCEDURE — A9579 GAD-BASE MR CONTRAST NOS,1ML: HCPCS | Performed by: INTERNAL MEDICINE

## 2018-01-01 PROCEDURE — 85610 PROTHROMBIN TIME: CPT

## 2018-01-01 PROCEDURE — 770004 HCHG ROOM/CARE - ONCOLOGY PRIVATE *

## 2018-01-01 PROCEDURE — 302255 BARRIER CREAM MOISTURE BAZA PROTECT (ZINC) 5OZ: Performed by: INTERNAL MEDICINE

## 2018-01-01 PROCEDURE — 77336 RADIATION PHYSICS CONSULT: CPT | Performed by: RADIOLOGY

## 2018-01-01 PROCEDURE — 97535 SELF CARE MNGMENT TRAINING: CPT

## 2018-01-01 PROCEDURE — 700111 HCHG RX REV CODE 636 W/ 250 OVERRIDE (IP): Performed by: EMERGENCY MEDICINE

## 2018-01-01 PROCEDURE — 84145 PROCALCITONIN (PCT): CPT

## 2018-01-01 PROCEDURE — 302255 BARRIER CREAM MOISTURE BAZA PROTECT (ZINC) 5OZ: Performed by: HOSPITALIST

## 2018-01-01 PROCEDURE — 93306 TTE W/DOPPLER COMPLETE: CPT | Mod: 26 | Performed by: INTERNAL MEDICINE

## 2018-01-01 PROCEDURE — G0299 HHS/HOSPICE OF RN EA 15 MIN: HCPCS

## 2018-01-01 PROCEDURE — A4335 INCONTINENCE SUPPLY: HCPCS

## 2018-01-01 PROCEDURE — 77014 PR CT GUIDANCE PLACEMENT RAD THERAPY FIELDS: CPT | Mod: 26 | Performed by: RADIOLOGY

## 2018-01-01 PROCEDURE — 85027 COMPLETE CBC AUTOMATED: CPT

## 2018-01-01 PROCEDURE — 87086 URINE CULTURE/COLONY COUNT: CPT

## 2018-01-01 PROCEDURE — 51798 US URINE CAPACITY MEASURE: CPT

## 2018-01-01 PROCEDURE — G8988 SELF CARE GOAL STATUS: HCPCS | Mod: CI

## 2018-01-01 PROCEDURE — 93005 ELECTROCARDIOGRAM TRACING: CPT

## 2018-01-01 PROCEDURE — 74177 CT ABD & PELVIS W/CONTRAST: CPT

## 2018-01-01 PROCEDURE — 87186 SC STD MICRODIL/AGAR DIL: CPT

## 2018-01-01 PROCEDURE — 99223 1ST HOSP IP/OBS HIGH 75: CPT | Mod: AI | Performed by: HOSPITALIST

## 2018-01-01 PROCEDURE — 97530 THERAPEUTIC ACTIVITIES: CPT

## 2018-01-01 PROCEDURE — 77300 RADIATION THERAPY DOSE PLAN: CPT | Performed by: RADIOLOGY

## 2018-01-01 PROCEDURE — 700101 HCHG RX REV CODE 250: Performed by: INTERNAL MEDICINE

## 2018-01-01 PROCEDURE — 99204 OFFICE O/P NEW MOD 45 MIN: CPT | Performed by: NURSE PRACTITIONER

## 2018-01-01 PROCEDURE — 88311 DECALCIFY TISSUE: CPT

## 2018-01-01 PROCEDURE — 84443 ASSAY THYROID STIM HORMONE: CPT

## 2018-01-01 PROCEDURE — 700102 HCHG RX REV CODE 250 W/ 637 OVERRIDE(OP): Performed by: EMERGENCY MEDICINE

## 2018-01-01 PROCEDURE — A5120 SKIN BARRIER, WIPE OR SWAB: HCPCS

## 2018-01-01 PROCEDURE — 82607 VITAMIN B-12: CPT

## 2018-01-01 PROCEDURE — 84153 ASSAY OF PSA TOTAL: CPT

## 2018-01-01 PROCEDURE — 77280 THER RAD SIMULAJ FIELD SMPL: CPT | Performed by: RADIOLOGY

## 2018-01-01 PROCEDURE — G8978 MOBILITY CURRENT STATUS: HCPCS | Mod: CL

## 2018-01-01 PROCEDURE — 76775 US EXAM ABDO BACK WALL LIM: CPT

## 2018-01-01 PROCEDURE — A6250 SKIN SEAL PROTECT MOISTURIZR: HCPCS

## 2018-01-01 PROCEDURE — A9270 NON-COVERED ITEM OR SERVICE: HCPCS | Performed by: EMERGENCY MEDICINE

## 2018-01-01 PROCEDURE — 700111 HCHG RX REV CODE 636 W/ 250 OVERRIDE (IP): Performed by: HOSPITALIST

## 2018-01-01 PROCEDURE — G8987 SELF CARE CURRENT STATUS: HCPCS | Mod: CK

## 2018-01-01 PROCEDURE — 73501 X-RAY EXAM HIP UNI 1 VIEW: CPT | Mod: LT

## 2018-01-01 PROCEDURE — 83605 ASSAY OF LACTIC ACID: CPT | Mod: 91

## 2018-01-01 PROCEDURE — 77295 3-D RADIOTHERAPY PLAN: CPT | Mod: 26 | Performed by: RADIOLOGY

## 2018-01-01 PROCEDURE — 700105 HCHG RX REV CODE 258: Performed by: HOSPITALIST

## 2018-01-01 PROCEDURE — 88341 IMHCHEM/IMCYTCHM EA ADD ANTB: CPT

## 2018-01-01 PROCEDURE — 93880 EXTRACRANIAL BILAT STUDY: CPT | Mod: 26 | Performed by: SURGERY

## 2018-01-01 PROCEDURE — 770020 HCHG ROOM/CARE - TELE (206)

## 2018-01-01 PROCEDURE — A9503 TC99M MEDRONATE: HCPCS

## 2018-01-01 PROCEDURE — 70491 CT SOFT TISSUE NECK W/DYE: CPT

## 2018-01-01 PROCEDURE — 87040 BLOOD CULTURE FOR BACTERIA: CPT | Mod: 91

## 2018-01-01 PROCEDURE — 77334 RADIATION TREATMENT AID(S): CPT | Mod: 26 | Performed by: RADIOLOGY

## 2018-01-01 PROCEDURE — 88342 IMHCHEM/IMCYTCHM 1ST ANTB: CPT

## 2018-01-01 PROCEDURE — 77263 THER RADIOLOGY TX PLNG CPLX: CPT | Performed by: RADIOLOGY

## 2018-01-01 PROCEDURE — 80053 COMPREHEN METABOLIC PANEL: CPT | Mod: 91

## 2018-01-01 PROCEDURE — 82746 ASSAY OF FOLIC ACID SERUM: CPT

## 2018-01-01 PROCEDURE — 93970 EXTREMITY STUDY: CPT

## 2018-01-01 PROCEDURE — 77427 RADIATION TX MANAGEMENT X5: CPT | Performed by: RADIOLOGY

## 2018-01-01 PROCEDURE — 700117 HCHG RX CONTRAST REV CODE 255: Performed by: INTERNAL MEDICINE

## 2018-01-01 PROCEDURE — G8997 SWALLOW GOAL STATUS: HCPCS | Mod: CH

## 2018-01-01 PROCEDURE — 72148 MRI LUMBAR SPINE W/O DYE: CPT

## 2018-01-01 PROCEDURE — 99223 1ST HOSP IP/OBS HIGH 75: CPT | Mod: 25 | Performed by: RADIOLOGY

## 2018-01-01 PROCEDURE — G8979 MOBILITY GOAL STATUS: HCPCS | Mod: CI

## 2018-01-01 PROCEDURE — 77295 3-D RADIOTHERAPY PLAN: CPT | Performed by: RADIOLOGY

## 2018-01-01 PROCEDURE — 72157 MRI CHEST SPINE W/O & W/DYE: CPT

## 2018-01-01 PROCEDURE — 665036 HSPC NOTICE OF ELECTION NOE

## 2018-01-01 PROCEDURE — 94640 AIRWAY INHALATION TREATMENT: CPT

## 2018-01-01 PROCEDURE — 84156 ASSAY OF PROTEIN URINE: CPT

## 2018-01-01 PROCEDURE — 94668 MNPJ CHEST WALL SBSQ: CPT

## 2018-01-01 PROCEDURE — 96374 THER/PROPH/DIAG INJ IV PUSH: CPT

## 2018-01-01 PROCEDURE — 97161 PT EVAL LOW COMPLEX 20 MIN: CPT

## 2018-01-01 PROCEDURE — 77334 RADIATION TREATMENT AID(S): CPT | Performed by: RADIOLOGY

## 2018-01-01 PROCEDURE — 77417 THER RADIOLOGY PORT IMAGE(S): CPT | Performed by: RADIOLOGY

## 2018-01-01 PROCEDURE — 85025 COMPLETE CBC W/AUTO DIFF WBC: CPT | Mod: 91

## 2018-01-01 PROCEDURE — 97162 PT EVAL MOD COMPLEX 30 MIN: CPT

## 2018-01-01 PROCEDURE — 88307 TISSUE EXAM BY PATHOLOGIST: CPT

## 2018-01-01 PROCEDURE — 93306 TTE W/DOPPLER COMPLETE: CPT

## 2018-01-01 PROCEDURE — DP0C1ZZ BEAM RADIATION OF OTHER BONE USING PHOTONS 1 - 10 MEV: ICD-10-PCS | Performed by: RADIOLOGY

## 2018-01-01 PROCEDURE — 84165 PROTEIN E-PHORESIS SERUM: CPT

## 2018-01-01 PROCEDURE — 77290 THER RAD SIMULAJ FIELD CPLX: CPT | Performed by: RADIOLOGY

## 2018-01-01 PROCEDURE — 770006 HCHG ROOM/CARE - MED/SURG/GYN SEMI*

## 2018-01-01 PROCEDURE — 86140 C-REACTIVE PROTEIN: CPT

## 2018-01-01 PROCEDURE — 77307 TELETHX ISODOSE PLAN CPLX: CPT | Performed by: RADIOLOGY

## 2018-01-01 PROCEDURE — G8996 SWALLOW CURRENT STATUS: HCPCS | Mod: CH

## 2018-01-01 PROCEDURE — 700101 HCHG RX REV CODE 250

## 2018-01-01 PROCEDURE — 72156 MRI NECK SPINE W/O & W/DYE: CPT

## 2018-01-01 PROCEDURE — 87502 INFLUENZA DNA AMP PROBE: CPT

## 2018-01-01 PROCEDURE — 93005 ELECTROCARDIOGRAM TRACING: CPT | Performed by: EMERGENCY MEDICINE

## 2018-01-01 PROCEDURE — 700111 HCHG RX REV CODE 636 W/ 250 OVERRIDE (IP)

## 2018-01-01 PROCEDURE — 88360 TUMOR IMMUNOHISTOCHEM/MANUAL: CPT

## 2018-01-01 PROCEDURE — 700111 HCHG RX REV CODE 636 W/ 250 OVERRIDE (IP): Mod: JW | Performed by: RADIOLOGY

## 2018-01-01 PROCEDURE — 99214 OFFICE O/P EST MOD 30 MIN: CPT | Performed by: NURSE PRACTITIONER

## 2018-01-01 RX ORDER — LIDOCAINE HYDROCHLORIDE 10 MG/ML
INJECTION, SOLUTION INFILTRATION; PERINEURAL
Status: COMPLETED
Start: 2018-01-01 | End: 2018-01-01

## 2018-01-01 RX ORDER — TAMSULOSIN HYDROCHLORIDE 0.4 MG/1
0.4 CAPSULE ORAL
Status: DISCONTINUED | OUTPATIENT
Start: 2018-01-01 | End: 2018-01-01

## 2018-01-01 RX ORDER — OXYCODONE HYDROCHLORIDE 5 MG/1
5 TABLET ORAL
Status: DISCONTINUED | OUTPATIENT
Start: 2018-01-01 | End: 2018-01-01

## 2018-01-01 RX ORDER — SODIUM CHLORIDE 9 MG/ML
500 INJECTION, SOLUTION INTRAVENOUS
Status: ACTIVE | OUTPATIENT
Start: 2018-01-01 | End: 2018-01-01

## 2018-01-01 RX ORDER — OXYCODONE HYDROCHLORIDE 5 MG/1
2.5 TABLET ORAL
Status: DISCONTINUED | OUTPATIENT
Start: 2018-01-01 | End: 2018-01-01

## 2018-01-01 RX ORDER — IPRATROPIUM BROMIDE AND ALBUTEROL SULFATE 2.5; .5 MG/3ML; MG/3ML
3 SOLUTION RESPIRATORY (INHALATION)
Status: DISCONTINUED | OUTPATIENT
Start: 2018-01-01 | End: 2018-01-01 | Stop reason: HOSPADM

## 2018-01-01 RX ORDER — IPRATROPIUM BROMIDE AND ALBUTEROL SULFATE 2.5; .5 MG/3ML; MG/3ML
3 SOLUTION RESPIRATORY (INHALATION) 4 TIMES DAILY
Status: DISCONTINUED | OUTPATIENT
Start: 2018-01-01 | End: 2018-01-01

## 2018-01-01 RX ORDER — MIDAZOLAM HYDROCHLORIDE 1 MG/ML
.5-2 INJECTION INTRAMUSCULAR; INTRAVENOUS PRN
Status: ACTIVE | OUTPATIENT
Start: 2018-01-01 | End: 2018-01-01

## 2018-01-01 RX ORDER — AZITHROMYCIN 250 MG/1
500 TABLET, FILM COATED ORAL ONCE
Status: COMPLETED | OUTPATIENT
Start: 2018-01-01 | End: 2018-01-01

## 2018-01-01 RX ORDER — HEPARIN SODIUM 5000 [USP'U]/ML
5000 INJECTION, SOLUTION INTRAVENOUS; SUBCUTANEOUS EVERY 8 HOURS
Status: DISCONTINUED | OUTPATIENT
Start: 2018-01-01 | End: 2018-01-01

## 2018-01-01 RX ORDER — BISACODYL 10 MG
10 SUPPOSITORY, RECTAL RECTAL
Status: DISCONTINUED | OUTPATIENT
Start: 2018-01-01 | End: 2018-01-01

## 2018-01-01 RX ORDER — COLCHICINE 0.6 MG/1
0.6 TABLET ORAL 2 TIMES DAILY
Status: DISCONTINUED | OUTPATIENT
Start: 2018-01-01 | End: 2018-01-01 | Stop reason: HOSPADM

## 2018-01-01 RX ORDER — POLYETHYLENE GLYCOL 3350 17 G/17G
1 POWDER, FOR SOLUTION ORAL
Status: DISCONTINUED | OUTPATIENT
Start: 2018-01-01 | End: 2018-01-01

## 2018-01-01 RX ORDER — MIDAZOLAM HYDROCHLORIDE 1 MG/ML
INJECTION INTRAMUSCULAR; INTRAVENOUS
Status: COMPLETED
Start: 2018-01-01 | End: 2018-01-01

## 2018-01-01 RX ORDER — CEFDINIR 300 MG/1
300 CAPSULE ORAL EVERY 12 HOURS
Status: COMPLETED | OUTPATIENT
Start: 2018-01-01 | End: 2018-01-01

## 2018-01-01 RX ORDER — IPRATROPIUM BROMIDE AND ALBUTEROL SULFATE 2.5; .5 MG/3ML; MG/3ML
3 SOLUTION RESPIRATORY (INHALATION) 4 TIMES DAILY
Qty: 30 BULLET | Refills: 0 | Status: SHIPPED | OUTPATIENT
Start: 2018-01-01

## 2018-01-01 RX ORDER — AZITHROMYCIN 500 MG/1
500 INJECTION, POWDER, LYOPHILIZED, FOR SOLUTION INTRAVENOUS ONCE
Status: DISCONTINUED | OUTPATIENT
Start: 2018-01-01 | End: 2018-01-01

## 2018-01-01 RX ORDER — ONDANSETRON 2 MG/ML
4 INJECTION INTRAMUSCULAR; INTRAVENOUS EVERY 4 HOURS PRN
Status: DISCONTINUED | OUTPATIENT
Start: 2018-01-01 | End: 2018-01-01 | Stop reason: HOSPADM

## 2018-01-01 RX ORDER — COLCHICINE 0.6 MG/1
0.6 TABLET ORAL 2 TIMES DAILY
Qty: 30 TAB | Refills: 0 | Status: SHIPPED | OUTPATIENT
Start: 2018-01-01

## 2018-01-01 RX ORDER — TAMSULOSIN HYDROCHLORIDE 0.4 MG/1
0.8 CAPSULE ORAL
Status: DISCONTINUED | OUTPATIENT
Start: 2018-01-01 | End: 2018-01-01 | Stop reason: HOSPADM

## 2018-01-01 RX ORDER — HEPARIN SODIUM 1000 [USP'U]/ML
3200 INJECTION, SOLUTION INTRAVENOUS; SUBCUTANEOUS PRN
Status: DISCONTINUED | OUTPATIENT
Start: 2018-01-01 | End: 2018-01-01 | Stop reason: HOSPADM

## 2018-01-01 RX ORDER — TAMSULOSIN HYDROCHLORIDE 0.4 MG/1
0.8 CAPSULE ORAL
Qty: 30 CAP | Refills: 0 | Status: SHIPPED | OUTPATIENT
Start: 2018-01-01

## 2018-01-01 RX ORDER — SODIUM CHLORIDE 9 MG/ML
INJECTION, SOLUTION INTRAVENOUS CONTINUOUS
Status: DISCONTINUED | OUTPATIENT
Start: 2018-01-01 | End: 2018-01-01

## 2018-01-01 RX ORDER — CEFTRIAXONE 2 G/1
2 INJECTION, POWDER, FOR SOLUTION INTRAMUSCULAR; INTRAVENOUS ONCE
Status: COMPLETED | OUTPATIENT
Start: 2018-01-01 | End: 2018-01-01

## 2018-01-01 RX ORDER — FINASTERIDE 5 MG/1
5 TABLET, FILM COATED ORAL DAILY
Qty: 30 TAB | Refills: 0 | Status: SHIPPED | OUTPATIENT
Start: 2018-01-01

## 2018-01-01 RX ORDER — FINASTERIDE 5 MG/1
5 TABLET, FILM COATED ORAL
Status: DISCONTINUED | OUTPATIENT
Start: 2018-01-01 | End: 2018-01-01 | Stop reason: HOSPADM

## 2018-01-01 RX ORDER — AMOXICILLIN 250 MG
2 CAPSULE ORAL 2 TIMES DAILY
Status: DISCONTINUED | OUTPATIENT
Start: 2018-01-01 | End: 2018-01-01

## 2018-01-01 RX ORDER — ACETAMINOPHEN 325 MG/1
650 TABLET ORAL EVERY 6 HOURS PRN
Status: DISCONTINUED | OUTPATIENT
Start: 2018-01-01 | End: 2018-01-01 | Stop reason: HOSPADM

## 2018-01-01 RX ORDER — POTASSIUM CHLORIDE 20 MEQ/1
40 TABLET, EXTENDED RELEASE ORAL ONCE
Status: COMPLETED | OUTPATIENT
Start: 2018-01-01 | End: 2018-01-01

## 2018-01-01 RX ORDER — MORPHINE SULFATE 4 MG/ML
2 INJECTION, SOLUTION INTRAMUSCULAR; INTRAVENOUS
Status: DISCONTINUED | OUTPATIENT
Start: 2018-01-01 | End: 2018-01-01

## 2018-01-01 RX ORDER — ONDANSETRON 4 MG/1
4 TABLET, ORALLY DISINTEGRATING ORAL EVERY 4 HOURS PRN
Status: DISCONTINUED | OUTPATIENT
Start: 2018-01-01 | End: 2018-01-01 | Stop reason: HOSPADM

## 2018-01-01 RX ORDER — SODIUM CHLORIDE FOR INHALATION 3 %
3 VIAL, NEBULIZER (ML) INHALATION
Status: DISCONTINUED | OUTPATIENT
Start: 2018-01-01 | End: 2018-01-01 | Stop reason: ALTCHOICE

## 2018-01-01 RX ORDER — MORPHINE SULFATE 4 MG/ML
2-4 INJECTION, SOLUTION INTRAMUSCULAR; INTRAVENOUS EVERY 4 HOURS PRN
Status: DISCONTINUED | OUTPATIENT
Start: 2018-01-01 | End: 2018-01-01 | Stop reason: HOSPADM

## 2018-01-01 RX ORDER — CEFDINIR 300 MG/1
300 CAPSULE ORAL 2 TIMES DAILY
Qty: 14 CAP | Refills: 0 | Status: SHIPPED | OUTPATIENT
Start: 2018-01-01 | End: 2018-01-01

## 2018-01-01 RX ORDER — DEXAMETHASONE SODIUM PHOSPHATE 4 MG/ML
6 INJECTION, SOLUTION INTRA-ARTICULAR; INTRALESIONAL; INTRAMUSCULAR; INTRAVENOUS; SOFT TISSUE EVERY 6 HOURS
Status: DISCONTINUED | OUTPATIENT
Start: 2018-01-01 | End: 2018-01-01 | Stop reason: HOSPADM

## 2018-01-01 RX ORDER — DEXAMETHASONE 6 MG/1
6 TABLET ORAL EVERY 8 HOURS
Qty: 45 TAB | Refills: 0 | Status: SHIPPED | OUTPATIENT
Start: 2018-01-01

## 2018-01-01 RX ORDER — ONDANSETRON 2 MG/ML
4 INJECTION INTRAMUSCULAR; INTRAVENOUS PRN
Status: ACTIVE | OUTPATIENT
Start: 2018-01-01 | End: 2018-01-01

## 2018-01-01 RX ORDER — AZITHROMYCIN 250 MG/1
250 TABLET, FILM COATED ORAL DAILY
Status: COMPLETED | OUTPATIENT
Start: 2018-01-01 | End: 2018-01-01

## 2018-01-01 RX ORDER — ACETAMINOPHEN 325 MG/1
650 TABLET ORAL EVERY 6 HOURS PRN
Qty: 30 TAB | Refills: 0 | COMMUNITY
Start: 2018-01-01

## 2018-01-01 RX ORDER — HEPARIN SODIUM 1000 [USP'U]/ML
6000 INJECTION, SOLUTION INTRAVENOUS; SUBCUTANEOUS ONCE
Status: COMPLETED | OUTPATIENT
Start: 2018-01-01 | End: 2018-01-01

## 2018-01-01 RX ADMIN — AZITHROMYCIN 250 MG: 250 TABLET, FILM COATED ORAL at 10:42

## 2018-01-01 RX ADMIN — HEPARIN SODIUM 1450 UNITS/HR: 5000 INJECTION, SOLUTION INTRAVENOUS at 07:14

## 2018-01-01 RX ADMIN — IPRATROPIUM BROMIDE AND ALBUTEROL SULFATE 3 ML: .5; 3 SOLUTION RESPIRATORY (INHALATION) at 18:40

## 2018-01-01 RX ADMIN — MIDAZOLAM 2 MG: 1 INJECTION INTRAMUSCULAR; INTRAVENOUS at 11:06

## 2018-01-01 RX ADMIN — HEPARIN SODIUM 1450 UNITS: 5000 INJECTION, SOLUTION INTRAVENOUS at 17:47

## 2018-01-01 RX ADMIN — DEXAMETHASONE SODIUM PHOSPHATE 6 MG: 4 INJECTION, SOLUTION INTRAMUSCULAR; INTRAVENOUS at 11:35

## 2018-01-01 RX ADMIN — DEXAMETHASONE SODIUM PHOSPHATE 6 MG: 4 INJECTION, SOLUTION INTRAMUSCULAR; INTRAVENOUS at 00:03

## 2018-01-01 RX ADMIN — CEFTRIAXONE 2 G: 2 INJECTION, POWDER, FOR SOLUTION INTRAMUSCULAR; INTRAVENOUS at 07:41

## 2018-01-01 RX ADMIN — TAMSULOSIN HYDROCHLORIDE 0.8 MG: 0.4 CAPSULE ORAL at 22:11

## 2018-01-01 RX ADMIN — CEFTRIAXONE SODIUM 2 G: 2 INJECTION, POWDER, FOR SOLUTION INTRAMUSCULAR; INTRAVENOUS at 14:48

## 2018-01-01 RX ADMIN — TAMSULOSIN HYDROCHLORIDE 0.8 MG: 0.4 CAPSULE ORAL at 20:03

## 2018-01-01 RX ADMIN — COLCHICINE 0.6 MG: 0.6 TABLET, FILM COATED ORAL at 09:38

## 2018-01-01 RX ADMIN — COLCHICINE 0.6 MG: 0.6 TABLET, FILM COATED ORAL at 20:10

## 2018-01-01 RX ADMIN — DEXAMETHASONE SODIUM PHOSPHATE 6 MG: 4 INJECTION, SOLUTION INTRAMUSCULAR; INTRAVENOUS at 07:13

## 2018-01-01 RX ADMIN — COLCHICINE 0.6 MG: 0.6 TABLET, FILM COATED ORAL at 08:46

## 2018-01-01 RX ADMIN — DEXAMETHASONE SODIUM PHOSPHATE 6 MG: 4 INJECTION, SOLUTION INTRAMUSCULAR; INTRAVENOUS at 18:31

## 2018-01-01 RX ADMIN — DEXAMETHASONE SODIUM PHOSPHATE 6 MG: 4 INJECTION, SOLUTION INTRAMUSCULAR; INTRAVENOUS at 12:11

## 2018-01-01 RX ADMIN — TAMSULOSIN HYDROCHLORIDE 0.8 MG: 0.4 CAPSULE ORAL at 20:01

## 2018-01-01 RX ADMIN — AZITHROMYCIN 250 MG: 250 TABLET, FILM COATED ORAL at 09:46

## 2018-01-01 RX ADMIN — AZITHROMYCIN 250 MG: 250 TABLET, FILM COATED ORAL at 07:41

## 2018-01-01 RX ADMIN — HEPARIN SODIUM 3200 UNITS: 1000 INJECTION, SOLUTION INTRAVENOUS; SUBCUTANEOUS at 01:02

## 2018-01-01 RX ADMIN — DEXAMETHASONE SODIUM PHOSPHATE 6 MG: 4 INJECTION, SOLUTION INTRAMUSCULAR; INTRAVENOUS at 12:01

## 2018-01-01 RX ADMIN — COLCHICINE 0.6 MG: 0.6 TABLET, FILM COATED ORAL at 20:03

## 2018-01-01 RX ADMIN — DEXAMETHASONE SODIUM PHOSPHATE 6 MG: 4 INJECTION, SOLUTION INTRAMUSCULAR; INTRAVENOUS at 19:13

## 2018-01-01 RX ADMIN — FINASTERIDE 5 MG: 5 TABLET, FILM COATED ORAL at 20:16

## 2018-01-01 RX ADMIN — ACETAMINOPHEN 650 MG: 325 TABLET, FILM COATED ORAL at 20:40

## 2018-01-01 RX ADMIN — DEXAMETHASONE SODIUM PHOSPHATE 6 MG: 4 INJECTION, SOLUTION INTRAMUSCULAR; INTRAVENOUS at 19:18

## 2018-01-01 RX ADMIN — IOHEXOL 100 ML: 350 INJECTION, SOLUTION INTRAVENOUS at 19:13

## 2018-01-01 RX ADMIN — HEPARIN SODIUM 1050 UNITS/HR: 5000 INJECTION, SOLUTION INTRAVENOUS at 17:54

## 2018-01-01 RX ADMIN — DEXAMETHASONE SODIUM PHOSPHATE 6 MG: 4 INJECTION, SOLUTION INTRAMUSCULAR; INTRAVENOUS at 12:42

## 2018-01-01 RX ADMIN — HEPARIN SODIUM 5000 UNITS: 5000 INJECTION, SOLUTION INTRAVENOUS; SUBCUTANEOUS at 05:24

## 2018-01-01 RX ADMIN — COLCHICINE 0.6 MG: 0.6 TABLET, FILM COATED ORAL at 09:58

## 2018-01-01 RX ADMIN — AZITHROMYCIN 500 MG: 250 TABLET, FILM COATED ORAL at 14:48

## 2018-01-01 RX ADMIN — COLCHICINE 0.6 MG: 0.6 TABLET, FILM COATED ORAL at 09:14

## 2018-01-01 RX ADMIN — HEPARIN SODIUM 1450 UNITS/HR: 5000 INJECTION, SOLUTION INTRAVENOUS at 14:34

## 2018-01-01 RX ADMIN — HEPARIN SODIUM 5000 UNITS: 5000 INJECTION, SOLUTION INTRAVENOUS; SUBCUTANEOUS at 20:40

## 2018-01-01 RX ADMIN — COLCHICINE 0.6 MG: 0.6 TABLET, FILM COATED ORAL at 08:26

## 2018-01-01 RX ADMIN — TAMSULOSIN HYDROCHLORIDE 0.8 MG: 0.4 CAPSULE ORAL at 20:55

## 2018-01-01 RX ADMIN — HEPARIN SODIUM 1200 UNITS: 5000 INJECTION, SOLUTION INTRAVENOUS at 16:15

## 2018-01-01 RX ADMIN — DEXAMETHASONE SODIUM PHOSPHATE 6 MG: 4 INJECTION, SOLUTION INTRAMUSCULAR; INTRAVENOUS at 05:20

## 2018-01-01 RX ADMIN — DEXAMETHASONE SODIUM PHOSPHATE 6 MG: 4 INJECTION, SOLUTION INTRAMUSCULAR; INTRAVENOUS at 12:34

## 2018-01-01 RX ADMIN — DEXAMETHASONE SODIUM PHOSPHATE 6 MG: 4 INJECTION, SOLUTION INTRAMUSCULAR; INTRAVENOUS at 06:17

## 2018-01-01 RX ADMIN — LIDOCAINE HYDROCHLORIDE: 10 INJECTION, SOLUTION INFILTRATION; PERINEURAL at 10:30

## 2018-01-01 RX ADMIN — FINASTERIDE 5 MG: 5 TABLET, FILM COATED ORAL at 20:40

## 2018-01-01 RX ADMIN — DEXAMETHASONE SODIUM PHOSPHATE 6 MG: 4 INJECTION, SOLUTION INTRAMUSCULAR; INTRAVENOUS at 00:06

## 2018-01-01 RX ADMIN — FINASTERIDE 5 MG: 5 TABLET, FILM COATED ORAL at 22:34

## 2018-01-01 RX ADMIN — CEFDINIR 300 MG: 300 CAPSULE ORAL at 09:58

## 2018-01-01 RX ADMIN — SODIUM CHLORIDE: 9 INJECTION, SOLUTION INTRAVENOUS at 21:59

## 2018-01-01 RX ADMIN — FINASTERIDE 5 MG: 5 TABLET, FILM COATED ORAL at 20:10

## 2018-01-01 RX ADMIN — COLCHICINE 0.6 MG: 0.6 TABLET, FILM COATED ORAL at 08:48

## 2018-01-01 RX ADMIN — DEXAMETHASONE SODIUM PHOSPHATE 6 MG: 4 INJECTION, SOLUTION INTRAMUSCULAR; INTRAVENOUS at 22:35

## 2018-01-01 RX ADMIN — COLCHICINE 0.6 MG: 0.6 TABLET, FILM COATED ORAL at 08:45

## 2018-01-01 RX ADMIN — TAMSULOSIN HYDROCHLORIDE 0.8 MG: 0.4 CAPSULE ORAL at 20:09

## 2018-01-01 RX ADMIN — HEPARIN SODIUM 5000 UNITS: 5000 INJECTION, SOLUTION INTRAVENOUS; SUBCUTANEOUS at 16:00

## 2018-01-01 RX ADMIN — DEXAMETHASONE SODIUM PHOSPHATE 6 MG: 4 INJECTION, SOLUTION INTRAMUSCULAR; INTRAVENOUS at 01:10

## 2018-01-01 RX ADMIN — TAMSULOSIN HYDROCHLORIDE 0.4 MG: 0.4 CAPSULE ORAL at 21:59

## 2018-01-01 RX ADMIN — CEFTRIAXONE 2 G: 2 INJECTION, POWDER, FOR SOLUTION INTRAMUSCULAR; INTRAVENOUS at 10:43

## 2018-01-01 RX ADMIN — COLCHICINE 0.6 MG: 0.6 TABLET, FILM COATED ORAL at 20:01

## 2018-01-01 RX ADMIN — DEXAMETHASONE SODIUM PHOSPHATE 6 MG: 4 INJECTION, SOLUTION INTRAMUSCULAR; INTRAVENOUS at 05:41

## 2018-01-01 RX ADMIN — HEPARIN SODIUM 1050 UNITS/HR: 5000 INJECTION, SOLUTION INTRAVENOUS at 17:55

## 2018-01-01 RX ADMIN — TAMSULOSIN HYDROCHLORIDE 0.8 MG: 0.4 CAPSULE ORAL at 21:43

## 2018-01-01 RX ADMIN — SODIUM CHLORIDE SOLN NEBU 3% 3 ML: 3 NEBU SOLN at 18:41

## 2018-01-01 RX ADMIN — DEXAMETHASONE SODIUM PHOSPHATE 6 MG: 4 INJECTION, SOLUTION INTRAMUSCULAR; INTRAVENOUS at 01:05

## 2018-01-01 RX ADMIN — CEFTRIAXONE 2 G: 2 INJECTION, POWDER, FOR SOLUTION INTRAMUSCULAR; INTRAVENOUS at 09:46

## 2018-01-01 RX ADMIN — DEXAMETHASONE SODIUM PHOSPHATE 6 MG: 4 INJECTION, SOLUTION INTRAMUSCULAR; INTRAVENOUS at 05:45

## 2018-01-01 RX ADMIN — AZITHROMYCIN 250 MG: 250 TABLET, FILM COATED ORAL at 09:38

## 2018-01-01 RX ADMIN — COLCHICINE 0.6 MG: 0.6 TABLET, FILM COATED ORAL at 22:11

## 2018-01-01 RX ADMIN — COLCHICINE 0.6 MG: 0.6 TABLET, FILM COATED ORAL at 22:01

## 2018-01-01 RX ADMIN — SODIUM CHLORIDE SOLN NEBU 3% 3 ML: 3 NEBU SOLN at 06:38

## 2018-01-01 RX ADMIN — DEXAMETHASONE SODIUM PHOSPHATE 6 MG: 4 INJECTION, SOLUTION INTRAMUSCULAR; INTRAVENOUS at 12:36

## 2018-01-01 RX ADMIN — COLCHICINE 0.6 MG: 0.6 TABLET, FILM COATED ORAL at 09:26

## 2018-01-01 RX ADMIN — COLCHICINE 0.6 MG: 0.6 TABLET, FILM COATED ORAL at 22:18

## 2018-01-01 RX ADMIN — DEXAMETHASONE SODIUM PHOSPHATE 6 MG: 4 INJECTION, SOLUTION INTRAMUSCULAR; INTRAVENOUS at 18:01

## 2018-01-01 RX ADMIN — COLCHICINE 0.6 MG: 0.6 TABLET, FILM COATED ORAL at 21:44

## 2018-01-01 RX ADMIN — HEPARIN SODIUM 5000 UNITS: 5000 INJECTION, SOLUTION INTRAVENOUS; SUBCUTANEOUS at 13:59

## 2018-01-01 RX ADMIN — DEXAMETHASONE SODIUM PHOSPHATE 6 MG: 4 INJECTION, SOLUTION INTRAMUSCULAR; INTRAVENOUS at 05:48

## 2018-01-01 RX ADMIN — GADODIAMIDE 20 ML: 287 INJECTION INTRAVENOUS at 01:04

## 2018-01-01 RX ADMIN — COLCHICINE 0.6 MG: 0.6 TABLET, FILM COATED ORAL at 00:06

## 2018-01-01 RX ADMIN — FINASTERIDE 5 MG: 5 TABLET, FILM COATED ORAL at 20:01

## 2018-01-01 RX ADMIN — DEXAMETHASONE SODIUM PHOSPHATE 6 MG: 4 INJECTION, SOLUTION INTRAMUSCULAR; INTRAVENOUS at 17:47

## 2018-01-01 RX ADMIN — CEFTRIAXONE 2 G: 2 INJECTION, POWDER, FOR SOLUTION INTRAMUSCULAR; INTRAVENOUS at 09:39

## 2018-01-01 RX ADMIN — TAMSULOSIN HYDROCHLORIDE 0.8 MG: 0.4 CAPSULE ORAL at 20:16

## 2018-01-01 RX ADMIN — FENTANYL CITRATE 25 MCG: 50 INJECTION, SOLUTION INTRAMUSCULAR; INTRAVENOUS at 11:17

## 2018-01-01 RX ADMIN — COLCHICINE 0.6 MG: 0.6 TABLET, FILM COATED ORAL at 20:16

## 2018-01-01 RX ADMIN — DEXAMETHASONE SODIUM PHOSPHATE 6 MG: 4 INJECTION, SOLUTION INTRAMUSCULAR; INTRAVENOUS at 12:25

## 2018-01-01 RX ADMIN — ACETAMINOPHEN 650 MG: 325 TABLET, FILM COATED ORAL at 17:22

## 2018-01-01 RX ADMIN — ACETAMINOPHEN 650 MG: 325 TABLET, FILM COATED ORAL at 21:59

## 2018-01-01 RX ADMIN — FINASTERIDE 5 MG: 5 TABLET, FILM COATED ORAL at 20:55

## 2018-01-01 RX ADMIN — CEFDINIR 300 MG: 300 CAPSULE ORAL at 22:18

## 2018-01-01 RX ADMIN — MIDAZOLAM 2 MG: 1 INJECTION INTRAMUSCULAR; INTRAVENOUS at 11:10

## 2018-01-01 RX ADMIN — HEPARIN SODIUM 3200 UNITS: 1000 INJECTION, SOLUTION INTRAVENOUS; SUBCUTANEOUS at 22:22

## 2018-01-01 RX ADMIN — HEPARIN SODIUM 1350 UNITS/HR: 5000 INJECTION, SOLUTION INTRAVENOUS at 15:35

## 2018-01-01 RX ADMIN — FINASTERIDE 5 MG: 5 TABLET, FILM COATED ORAL at 22:01

## 2018-01-01 RX ADMIN — HEPARIN SODIUM 5000 UNITS: 5000 INJECTION, SOLUTION INTRAVENOUS; SUBCUTANEOUS at 21:58

## 2018-01-01 RX ADMIN — SODIUM CHLORIDE SOLN NEBU 3% 3 ML: 3 NEBU SOLN at 11:02

## 2018-01-01 RX ADMIN — CEFDINIR 300 MG: 300 CAPSULE ORAL at 20:55

## 2018-01-01 RX ADMIN — HEPARIN SODIUM 5000 UNITS: 5000 INJECTION, SOLUTION INTRAVENOUS; SUBCUTANEOUS at 05:32

## 2018-01-01 RX ADMIN — HEPARIN SODIUM 1600 UNITS/HR: 5000 INJECTION, SOLUTION INTRAVENOUS at 22:20

## 2018-01-01 RX ADMIN — DEXAMETHASONE SODIUM PHOSPHATE 6 MG: 4 INJECTION, SOLUTION INTRAMUSCULAR; INTRAVENOUS at 13:03

## 2018-01-01 RX ADMIN — ACETAMINOPHEN 650 MG: 325 TABLET, FILM COATED ORAL at 10:48

## 2018-01-01 RX ADMIN — TAMSULOSIN HYDROCHLORIDE 0.8 MG: 0.4 CAPSULE ORAL at 22:01

## 2018-01-01 RX ADMIN — FINASTERIDE 5 MG: 5 TABLET, FILM COATED ORAL at 22:18

## 2018-01-01 RX ADMIN — HEPARIN SODIUM 25000 UNITS: 5000 INJECTION, SOLUTION INTRAVENOUS at 00:03

## 2018-01-01 RX ADMIN — CEFDINIR 300 MG: 300 CAPSULE ORAL at 08:46

## 2018-01-01 RX ADMIN — IPRATROPIUM BROMIDE AND ALBUTEROL SULFATE 3 ML: .5; 3 SOLUTION RESPIRATORY (INHALATION) at 11:51

## 2018-01-01 RX ADMIN — HEPARIN SODIUM 6000 UNITS: 1000 INJECTION, SOLUTION INTRAVENOUS; SUBCUTANEOUS at 14:37

## 2018-01-01 RX ADMIN — COLCHICINE 0.6 MG: 0.6 TABLET, FILM COATED ORAL at 08:33

## 2018-01-01 RX ADMIN — DEXAMETHASONE SODIUM PHOSPHATE 6 MG: 4 INJECTION, SOLUTION INTRAMUSCULAR; INTRAVENOUS at 17:55

## 2018-01-01 RX ADMIN — HEPARIN SODIUM 1450 UNITS/HR: 5000 INJECTION, SOLUTION INTRAVENOUS at 02:08

## 2018-01-01 RX ADMIN — IPRATROPIUM BROMIDE AND ALBUTEROL SULFATE 3 ML: .5; 3 SOLUTION RESPIRATORY (INHALATION) at 11:02

## 2018-01-01 RX ADMIN — IPRATROPIUM BROMIDE AND ALBUTEROL SULFATE 3 ML: .5; 3 SOLUTION RESPIRATORY (INHALATION) at 06:38

## 2018-01-01 RX ADMIN — DEXAMETHASONE SODIUM PHOSPHATE 6 MG: 4 INJECTION, SOLUTION INTRAMUSCULAR; INTRAVENOUS at 12:13

## 2018-01-01 RX ADMIN — FINASTERIDE 5 MG: 5 TABLET, FILM COATED ORAL at 20:03

## 2018-01-01 RX ADMIN — TAMSULOSIN HYDROCHLORIDE 0.8 MG: 0.4 CAPSULE ORAL at 22:34

## 2018-01-01 RX ADMIN — FINASTERIDE 5 MG: 5 TABLET, FILM COATED ORAL at 22:11

## 2018-01-01 RX ADMIN — DEXAMETHASONE SODIUM PHOSPHATE 6 MG: 4 INJECTION, SOLUTION INTRAMUSCULAR; INTRAVENOUS at 07:44

## 2018-01-01 RX ADMIN — POTASSIUM CHLORIDE 40 MEQ: 1500 TABLET, EXTENDED RELEASE ORAL at 16:00

## 2018-01-01 RX ADMIN — DEXAMETHASONE SODIUM PHOSPHATE 6 MG: 4 INJECTION, SOLUTION INTRAMUSCULAR; INTRAVENOUS at 18:05

## 2018-01-01 RX ADMIN — HEPARIN SODIUM 1450 UNITS/HR: 5000 INJECTION, SOLUTION INTRAVENOUS at 07:58

## 2018-01-01 RX ADMIN — HEPARIN SODIUM 5000 UNITS: 5000 INJECTION, SOLUTION INTRAVENOUS; SUBCUTANEOUS at 05:28

## 2018-01-01 RX ADMIN — HEPARIN SODIUM 5000 UNITS: 5000 INJECTION, SOLUTION INTRAVENOUS; SUBCUTANEOUS at 21:44

## 2018-01-01 RX ADMIN — COLCHICINE 0.6 MG: 0.6 TABLET, FILM COATED ORAL at 07:41

## 2018-01-01 RX ADMIN — DEXAMETHASONE SODIUM PHOSPHATE 6 MG: 4 INJECTION, SOLUTION INTRAMUSCULAR; INTRAVENOUS at 00:21

## 2018-01-01 RX ADMIN — COLCHICINE 0.6 MG: 0.6 TABLET, FILM COATED ORAL at 22:34

## 2018-01-01 RX ADMIN — TAMSULOSIN HYDROCHLORIDE 0.8 MG: 0.4 CAPSULE ORAL at 22:18

## 2018-01-01 RX ADMIN — MORPHINE SULFATE 2 MG: 4 INJECTION INTRAVENOUS at 23:14

## 2018-01-01 RX ADMIN — SODIUM CHLORIDE: 9 INJECTION, SOLUTION INTRAVENOUS at 11:53

## 2018-01-01 RX ADMIN — TAMSULOSIN HYDROCHLORIDE 0.8 MG: 0.4 CAPSULE ORAL at 20:40

## 2018-01-01 RX ADMIN — HEPARIN SODIUM 1450 UNITS/HR: 5000 INJECTION, SOLUTION INTRAVENOUS at 21:06

## 2018-01-01 RX ADMIN — HEPARIN SODIUM 5000 UNITS: 5000 INJECTION, SOLUTION INTRAVENOUS; SUBCUTANEOUS at 14:51

## 2018-01-01 RX ADMIN — DEXAMETHASONE SODIUM PHOSPHATE 6 MG: 4 INJECTION, SOLUTION INTRAMUSCULAR; INTRAVENOUS at 17:53

## 2018-01-01 RX ADMIN — DEXAMETHASONE SODIUM PHOSPHATE 6 MG: 4 INJECTION, SOLUTION INTRAMUSCULAR; INTRAVENOUS at 23:51

## 2018-01-01 RX ADMIN — HEPARIN SODIUM 3200 UNITS: 1000 INJECTION, SOLUTION INTRAVENOUS; SUBCUTANEOUS at 04:27

## 2018-01-01 RX ADMIN — FINASTERIDE 5 MG: 5 TABLET, FILM COATED ORAL at 21:43

## 2018-01-01 RX ADMIN — DEXAMETHASONE SODIUM PHOSPHATE 6 MG: 4 INJECTION, SOLUTION INTRAMUSCULAR; INTRAVENOUS at 05:43

## 2018-01-01 SDOH — ECONOMIC STABILITY: HOUSING INSECURITY: UNSAFE COOKING RANGE AREA: 0

## 2018-01-01 SDOH — ECONOMIC STABILITY: GENERAL

## 2018-01-01 SDOH — ECONOMIC STABILITY: HOUSING INSECURITY: UNSAFE APPLIANCES: 0

## 2018-01-01 ASSESSMENT — PAIN SCALES - GENERAL
PAINLEVEL_OUTOF10: 6
PAINLEVEL_OUTOF10: 5
PAINLEVEL_OUTOF10: 3
PAINLEVEL_OUTOF10: 0
PAINLEVEL_OUTOF10: 0
PAINLEVEL_OUTOF10: 5
PAINLEVEL_OUTOF10: 0
PAINLEVEL_OUTOF10: 5
PAINLEVEL_OUTOF10: 0
PAINLEVEL_OUTOF10: 5
PAINLEVEL_OUTOF10: 0
PAINLEVEL_OUTOF10: 10
PAINLEVEL_OUTOF10: 0
PAINLEVEL_OUTOF10: 3
PAINLEVEL_OUTOF10: 0
PAINLEVEL_OUTOF10: 4
PAINLEVEL_OUTOF10: 0
PAINLEVEL_OUTOF10: 0
PAINLEVEL_OUTOF10: 5
PAINLEVEL_OUTOF10: 0
PAINLEVEL_OUTOF10: 3
PAINLEVEL_OUTOF10: 1
PAINLEVEL_OUTOF10: 2
PAINLEVEL_OUTOF10: 0
PAINLEVEL_OUTOF10: 6
PAINLEVEL_OUTOF10: 0
PAINLEVEL_OUTOF10: 0
PAINLEVEL_OUTOF10: 5

## 2018-01-01 ASSESSMENT — GAIT ASSESSMENTS
GAIT LEVEL OF ASSIST: CONTACT GUARD ASSIST
DEVIATION: BRADYKINETIC;DECREASED BASE OF SUPPORT;ATAXIC
DEVIATION: BRADYKINETIC;SHUFFLED GAIT
GAIT LEVEL OF ASSIST: MINIMAL ASSIST
ASSISTIVE DEVICE: FRONT WHEEL WALKER
DISTANCE (FEET): 20
ASSISTIVE DEVICE: FRONT WHEEL WALKER
DISTANCE (FEET): 120

## 2018-01-01 ASSESSMENT — ENCOUNTER SYMPTOMS
PHOTOPHOBIA: 0
WHEEZING: 0
CHILLS: 0
INSOMNIA: 0
SPEECH CHANGE: 0
DIAPHORESIS: 0
HEADACHES: 0
FEVER: 0
ABDOMINAL PAIN: 0
MYALGIAS: 0
PND: 0
PND: 0
SENSORY CHANGE: 0
NERVOUS/ANXIOUS: 0
FALLS: 0
COUGH: 1
FEVER: 0
DOUBLE VISION: 0
DIARRHEA: 0
VOMITING: 0
NERVOUS/ANXIOUS: 0
HEMOPTYSIS: 0
SLEEP QUALITY: FAIR
NAUSEA: 0
DIZZINESS: 0
PALPITATIONS: 0
DEPRESSION: 0
VOMITING: 0
FATIGUE: 1
DIZZINESS: 0
NAUSEA: 0
CHILLS: 0
DIZZINESS: 0
INSOMNIA: 0
DOUBLE VISION: 0
BLOOD IN STOOL: 0
MYALGIAS: 0
CLAUDICATION: 0
WHEEZING: 0
SENSORY CHANGE: 0
HEARTBURN: 0
SPUTUM PRODUCTION: 0
BLURRED VISION: 0
PALPITATIONS: 0
COUGH: 1
PALPITATIONS: 0
PSYCHIATRIC NEGATIVE: 1
SPEECH CHANGE: 0
CLAUDICATION: 0
LAST BOWEL MOVEMENT: 64691
VOMITING: 0
HEADACHES: 0
DIAPHORESIS: 0
WEAKNESS: 0
DEPRESSION: 0
BLURRED VISION: 0
BLURRED VISION: 0
FEVER: 0
BACK PAIN: 0
DEPRESSION: 0
NAUSEA: 0
PND: 0
BACK PAIN: 1
DIZZINESS: 0
DIZZINESS: 0
BLURRED VISION: 0
FALLS: 1
NECK PAIN: 0
SHORTNESS OF BREATH: 0
VOMITING: 0
DIAPHORESIS: 0
CLAUDICATION: 0
HEARTBURN: 0
CLAUDICATION: 0
FALLS: 1
HEADACHES: 0
FEVER: 0
PHOTOPHOBIA: 0
DIZZINESS: 0
SHORTNESS OF BREATH: 0
CONSTIPATION: 0
HEADACHES: 0
DIARRHEA: 0
CHILLS: 0
COUGH: 0
ORTHOPNEA: 0
WEAKNESS: 1
WEAKNESS: 0
ABDOMINAL PAIN: 0
HEARTBURN: 0
WHEEZING: 0
PND: 0
CLAUDICATION: 0
SLEEP QUALITY: ADEQUATE
ABDOMINAL PAIN: 0
SPUTUM PRODUCTION: 1
ORTHOPNEA: 0
HEADACHES: 0
SPUTUM PRODUCTION: 1
FALLS: 0
ABDOMINAL PAIN: 0
BLURRED VISION: 0
PHOTOPHOBIA: 0
CHILLS: 0
DEPRESSION: 0
LOWER EXTREMITY EDEMA: 1
ROS GI COMMENTS: POOR APPETITE
FEVER: 0
NAUSEA: 0
CLAUDICATION: 0
HEADACHES: 0
CLAUDICATION: 0
PALPITATIONS: 0
CONSTIPATION: 0
WHEEZING: 0
BOWEL INCONTINENCE: 1
PALPITATIONS: 0
CHILLS: 0
ORTHOPNEA: 0
HEMOPTYSIS: 0
SHORTNESS OF BREATH: 1
FALLS: 1
SPUTUM PRODUCTION: 1
BACK PAIN: 0
CLAUDICATION: 0
COUGH: 1
DIARRHEA: 0
SHORTNESS OF BREATH: 0
FEVER: 0
DIARRHEA: 0
HEADACHES: 0
LAST BOWEL MOVEMENT: 64686
COUGH: 1
SLEEP QUALITY: ADEQUATE
DIARRHEA: 0
WHEEZING: 0
PND: 0
FALLS: 1
DOUBLE VISION: 0
MYALGIAS: 0
SHORTNESS OF BREATH: 1
LOSS OF CONSCIOUSNESS: 0
WEAKNESS: 1
FEVER: 0
DEPRESSION: 0
HEARTBURN: 0
CHILLS: 0
WEIGHT LOSS: 1
ORTHOPNEA: 0
SPUTUM PRODUCTION: 1
LOWER EXTREMITY EDEMA: 1
FEVER: 0
DIAPHORESIS: 0
BLOOD IN STOOL: 0
BLOOD IN STOOL: 0
DIARRHEA: 0
COUGH: 0
DIZZINESS: 0
DOUBLE VISION: 0
HEARTBURN: 0
BACK PAIN: 0
VOMITING: 0
VOMITING: 0
COUGH: 1
INSOMNIA: 0
NECK PAIN: 0
CONSTIPATION: 0
VOMITING: 0
BLURRED VISION: 0
DIZZINESS: 0
ORTHOPNEA: 0
VOMITING: 0
WEAKNESS: 0
WEAKNESS: 0
BACK PAIN: 0
SHORTNESS OF BREATH: 0
BLURRED VISION: 0
NECK PAIN: 0
WEAKNESS: 1
DIZZINESS: 0
FATIGUE: 1
ABDOMINAL PAIN: 0
COUGH: 0
BLURRED VISION: 0
MYALGIAS: 0
HEMOPTYSIS: 0
CONSTIPATION: 0
AGITATION: 1
DIAPHORESIS: 0
SENSORY CHANGE: 0
SHORTNESS OF BREATH: 0
HEADACHES: 0
DEPRESSION: 0
SHORTNESS OF BREATH: 1
ABDOMINAL PAIN: 0
COUGH: 1
SHORTNESS OF BREATH: 1
DIARRHEA: 0
ABDOMINAL PAIN: 0
CHILLS: 0
STOOL FREQUENCY: LESS THAN DAILY
LOSS OF CONSCIOUSNESS: 0
HEMOPTYSIS: 0
VOMITING: 0
CLAUDICATION: 0
HEARTBURN: 0
PALPITATIONS: 0
CONSTIPATION: 0
COUGH: 0
MYALGIAS: 0
HEARTBURN: 0
SPUTUM PRODUCTION: 1
PND: 0
WEAKNESS: 1
DOUBLE VISION: 0
BACK PAIN: 0
BLURRED VISION: 0
SPEECH CHANGE: 0
MYALGIAS: 0
CHILLS: 0
NECK PAIN: 0
DEPRESSION: 0
SHORTNESS OF BREATH: 1
ORTHOPNEA: 0
LOSS OF CONSCIOUSNESS: 0
ABDOMINAL PAIN: 0
HEMOPTYSIS: 0
HEADACHES: 0
FEVER: 0
NAUSEA: 0
ABDOMINAL PAIN: 0
CONSTIPATION: 0
CONSTIPATION: 0
DIAPHORESIS: 0
FATIGUE: 1
WEAKNESS: 1
NAUSEA: 0
MYALGIAS: 0
NERVOUS/ANXIOUS: 0
BLOOD IN STOOL: 0
DOUBLE VISION: 0
PSYCHIATRIC NEGATIVE: 1
CHILLS: 0

## 2018-01-01 ASSESSMENT — COGNITIVE AND FUNCTIONAL STATUS - GENERAL
HELP NEEDED FOR BATHING: A LOT
TOILETING: A LITTLE
MOBILITY SCORE: 11
MOVING FROM LYING ON BACK TO SITTING ON SIDE OF FLAT BED: A LOT
DRESSING REGULAR LOWER BODY CLOTHING: A LOT
SUGGESTED CMS G CODE MODIFIER MOBILITY: CL
STANDING UP FROM CHAIR USING ARMS: A LOT
DAILY ACTIVITIY SCORE: 18
MOVING TO AND FROM BED TO CHAIR: UNABLE
SUGGESTED CMS G CODE MODIFIER MOBILITY: CL
CLIMB 3 TO 5 STEPS WITH RAILING: A LOT
STANDING UP FROM CHAIR USING ARMS: A LOT
CLIMB 3 TO 5 STEPS WITH RAILING: A LOT
TOILETING: A LOT
SUGGESTED CMS G CODE MODIFIER DAILY ACTIVITY: CK
STANDING UP FROM CHAIR USING ARMS: A LITTLE
MOVING TO AND FROM BED TO CHAIR: UNABLE
WALKING IN HOSPITAL ROOM: A LITTLE
SUGGESTED CMS G CODE MODIFIER DAILY ACTIVITY: CK
TOILETING: A LITTLE
MOBILITY SCORE: 14
PERSONAL GROOMING: A LITTLE
MOVING FROM LYING ON BACK TO SITTING ON SIDE OF FLAT BED: UNABLE
SUGGESTED CMS G CODE MODIFIER MOBILITY: CK
DAILY ACTIVITIY SCORE: 18
DAILY ACTIVITIY SCORE: 19
DRESSING REGULAR UPPER BODY CLOTHING: A LITTLE
MOVING FROM LYING ON BACK TO SITTING ON SIDE OF FLAT BED: UNABLE
DRESSING REGULAR LOWER BODY CLOTHING: A LOT
HELP NEEDED FOR BATHING: A LOT
DRESSING REGULAR LOWER BODY CLOTHING: A LITTLE
MOVING TO AND FROM BED TO CHAIR: A LITTLE
WALKING IN HOSPITAL ROOM: A LOT
HELP NEEDED FOR BATHING: A LOT
WALKING IN HOSPITAL ROOM: A LOT
SUGGESTED CMS G CODE MODIFIER DAILY ACTIVITY: CK
CLIMB 3 TO 5 STEPS WITH RAILING: TOTAL
MOBILITY SCORE: 15

## 2018-01-01 ASSESSMENT — PATIENT HEALTH QUESTIONNAIRE - PHQ9
2. FEELING DOWN, DEPRESSED, IRRITABLE, OR HOPELESS: NOT AT ALL
CLINICAL INTERPRETATION OF PHQ2 SCORE: 0
SUM OF ALL RESPONSES TO PHQ9 QUESTIONS 1 AND 2: 0
SUM OF ALL RESPONSES TO PHQ QUESTIONS 1-9: 0
CLINICAL INTERPRETATION OF PHQ2 SCORE: 0
1. LITTLE INTEREST OR PLEASURE IN DOING THINGS: NOT AT ALL

## 2018-01-01 ASSESSMENT — COPD QUESTIONNAIRES
COPD SCREENING SCORE: 5
DO YOU EVER COUGH UP ANY MUCUS OR PHLEGM?: NO/ONLY WITH OCCASIONAL COLDS OR INFECTIONS
DURING THE PAST 4 WEEKS HOW MUCH DID YOU FEEL SHORT OF BREATH: SOME OF THE TIME
HAVE YOU SMOKED AT LEAST 100 CIGARETTES IN YOUR ENTIRE LIFE: YES

## 2018-01-01 ASSESSMENT — SOCIAL DETERMINANTS OF HEALTH (SDOH)
ACTIVE STRESSOR - EXHAUSTION: 1
ACTIVE STRESSOR - NO STRESS FACTORS: 1
ACTIVE STRESSOR - HEALTH CHANGES: 1
ACTIVE STRESSOR - NO STRESS FACTORS: 1

## 2018-01-01 ASSESSMENT — ACTIVITIES OF DAILY LIVING (ADL)
AMBULATION_REQUIRES_ASSISTANCE: 1
TOILETING: INDEPENDENT
MONEY MANAGEMENT (EXPENSES/BILLS): TOTALLY DEPENDENT
CONTINENCE_REQUIRES_ASSISTANCE: 1
MONEY MANAGEMENT (EXPENSES/BILLS): TOTALLY DEPENDENT
MONEY MANAGEMENT (EXPENSES/BILLS): NEEDS ASSISTANCE

## 2018-01-01 ASSESSMENT — LIFESTYLE VARIABLES
DO YOU DRINK ALCOHOL: NO
EVER_SMOKED: YES
DO YOU DRINK ALCOHOL: NO
EVER_SMOKED: YES
EVER_SMOKED: YES
ALCOHOL_USE: NO

## 2018-01-02 PROBLEM — R09.89 POOR PERIPHERAL CIRCULATION: Status: ACTIVE | Noted: 2018-01-01

## 2018-01-02 PROBLEM — R68.89 COLD INTOLERANCE: Status: ACTIVE | Noted: 2018-01-01

## 2018-01-02 PROBLEM — Z91.81 HISTORY OF FALL: Status: ACTIVE | Noted: 2018-01-01

## 2018-01-02 NOTE — PROGRESS NOTES
CC: fatigue/poor appetite       Mikey Kelly is a 89 y.o. male here with his daughter to establish care and to discuss the evaluation and management of:    1.Prostate   Patient states that he takes Flomax for his BPH. States he also self -caths himself at least once per night before bed. Patient states he follows up with Dr. Sales as his urologist. Denies any painful urination, fever, chills, blood in urine, changes in urine presentation.    2. Cough  Patient states that he's had this cough for a few weeks, he said he felt like he got phlegm in his chest. Denies any fevers, chills, nausea or vomiting. Denies any chest pain, shortness of breath, dizziness or lightheadedness or headache.    3. Fatigue   Patient states that he is just a little bit more tired. Doesn't do much throughout the day. Lives by himself.  He is to walk a few miles a day now doesn't do as much anymore.    4. Poor appetite  Patient states that he just doesn't have much of an appetite. States he had cereal for breakfast this morning.    5. Cold hands   Patient is at his hands are always cold.      ROS:  Denies any Headache, Blurred Vision, Confusion Chest pain,  Shortness of breath,  Abdominal pain, Changes of bowel or bladder, Lower ext edema, Fevers, Nights sweats, Weight Changes, Focal weakness or numbness.  All other systems are negative.      Current Outpatient Prescriptions:   •  tamsulosin (FLOMAX) 0.4 MG capsule, Take 0.4 mg by mouth every day., Disp: , Rfl:     Allergies   Allergen Reactions   • Pcn [Penicillins] Hives   • Sulfa Drugs        Past Medical History:   Diagnosis Date   • Dental disorder     upper   • Hard of hearing    • Memory loss      Past Surgical History:   Procedure Laterality Date   • CYSTOSCOPY  1/14/2013    Performed by Bradley Sales M.D. at SURGERY [facility]   • TRANS URETHRAL RESECTION PROSTATE  1/14/2013    Performed by Bradley Sales M.D. at [facility]   • OTHER ABDOMINAL SURGERY  1940`s     "hernia repair, appy   • OTHER ORTHOPEDIC SURGERY      right  rotator cuff repair     History reviewed. No pertinent family history.  Social History     Social History   • Marital status: Single     Spouse name: N/A   • Number of children: N/A   • Years of education: N/A     Occupational History   • Not on file.     Social History Main Topics   • Smoking status: Former Smoker     Packs/day: 0.50     Years: 25.00     Types: Cigarettes   • Smokeless tobacco: Never Used   • Alcohol use Yes      Comment: occ   • Drug use: No   • Sexual activity: Not Currently     Other Topics Concern   • Not on file     Social History Narrative   • No narrative on file       Objective:     Vitals: /74   Pulse 86   Temp 37.1 °C (98.7 °F)   Resp 16   Ht 1.956 m (6' 5\")   Wt 87.5 kg (193 lb)   SpO2 94%   BMI 22.89 kg/m²      General: Alert, pleasant, NAD  HEENT:  Normocephalic. Neck supple.  No thyromegaly or masses palpated. No cervical or supraclavicular lymphadenopathy.  Heart:  Regular rate and rhythm.  Distant heart tones, S1 and S2 normal.  No murmurs appreciated.  Respiratory:  Normal respiratory effort.  Clear to auscultation bilaterally. No wheezing, crackles or rales.  Skin:  Warm, dry, no rashes  Musculoskeletal:  Gait is normal.  Moves all extremities well.  Extremities:  Pedal pulses 2+ symmetric. No leg edema.  Neurological: No tremors, sensation grossly intact  Psych:  Flat affect, poor historian. Affect/mood is normal, judgement is good, memory is intact, grooming is appropriate.      Assessment and Plan.   89 y.o. male to establish and discuss the follwing    1. Retention of urine  Stable. Self caths once per night. Follows up with Urology  - URINALYSIS,CULTURE IF INDICATED; Future    2. Cough  Stable. Mildly productive. Clear lungs. Afebrile, likely resolving viral illness. If not improved then will order Chest xray.    3. Fatigue, unspecified type  4. Poor appetite  States not interested in eating. Discussed " getting over a viral illness or possibly feelings of depression. Scored a 0 on PQH-9. However states does not have much social activities and stays in frequently.   - VITAMIN B12; Future  - FOLATE; Future  - CBC WITHOUT DIFFERENTIAL; Future    5. Poor peripheral circulation (CMS-HCC)  6. Cold intolerance  Stable, oxygen saturation in clinic 95% re-check. Patients hands were cold to touch and purplish in color.   - VITAMIN B12; Future  - FOLATE; Future  - COMP METABOLIC PANEL; Future  - TSH WITH REFLEX TO FT4; Future    7. History of fall  Lives alone, does not use a cane/walker.Educated on importance of good lighting, avoiding throw rugs, having handles in the shower.   - Patient identified as fall risk.  Appropriate orders and counseling given.      Health Maintenance: declines flu vaccine    Return in about 3 weeks (around 1/23/2018).          Terri VARELA

## 2018-01-25 PROBLEM — N40.0 BPH (BENIGN PROSTATIC HYPERPLASIA): Status: ACTIVE | Noted: 2018-01-01

## 2018-01-25 PROBLEM — N18.30 CKD (CHRONIC KIDNEY DISEASE) STAGE 3, GFR 30-59 ML/MIN: Status: ACTIVE | Noted: 2018-01-01
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
## 2018-01-25 PROBLEM — J18.9 PNEUMONIA: Status: ACTIVE | Noted: 2018-01-01

## 2018-01-25 PROBLEM — I44.7 LBBB (LEFT BUNDLE BRANCH BLOCK): Status: ACTIVE | Noted: 2018-01-01

## 2018-01-25 PROBLEM — R55 SYNCOPE: Status: ACTIVE | Noted: 2018-01-01

## 2018-01-25 NOTE — ED NOTES
"Pt to triage with   Chief Complaint   Patient presents with   • Sent by MD     PCP for further testing. recent UTI and treatment   • Fall     fall 2 days ago, hit head, no LOC, no blood thinners. pt c/o pain to left hip with lifting left leg, \"pain all over\" when lifting leg   • Other     loss of appetite and difficulty walking after fall.   • Shortness of Breath     pain in ribs     Called for EKG.  No new confusion.  Charge informed of pt.  Pt Informed regarding triage process and verbalized understanding to inform triage tech or RN for any changes in condition. Placed in senior lounge.       "

## 2018-01-25 NOTE — ED PROVIDER NOTES
"ED Provider Note    Scribed for Bobo Mercado M.D. by Bobo Mercado. 1/25/2018,  1:20 PM.    CHIEF COMPLAINT  Chief Complaint   Patient presents with   • Sent by MD     PCP for further testing. recent UTI and treatment   • Fall     fall 2 days ago, hit head, no LOC, no blood thinners. pt c/o pain to left hip with lifting left leg, \"pain all over\" when lifting leg   • Other     loss of appetite and difficulty walking after fall.   • Shortness of Breath     pain in ribs       HPI  Mikey Kelly is a 89 y.o. male who presents to the Emergency Department from his primary care office due to multiple concerns. The patient exhibited fell a couple of days ago but is unclear of the details of the fall. He is describing essentially whole body pain, but pain is focused in his chest bilaterally. He thinks he may have hit his head, but did not lose consciousness and he is not on any blood thinners. Chest and rib pain is exacerbated by movement and by coughing. His legs also hurt, left greater than right. He has not had any nausea, vomiting, abdominal pain, fevers or chills, headache or dizziness. He has associated shortness of breath and productive cough. Coughing is painful. He denies hemoptysis. The shortness of breath is most notable going up stairs at home. He reported weight loss to his primary care doctor as well. He reports several months of ongoing weight loss and decreased appetite. He reports weight loss of 35 pounds over the course of several months. This is confirmed by weights at his primary care office. His records also show that he was treated for urinary tract infection several weeks ago. He completed his antibiotics, and denies any urinary symptoms at this time. Bedside, he is awake, alert, pleasant, cooperative, and does not appear in distress.         REVIEW OF SYSTEMS  See HPI for further details. All other systems are negative.     PAST MEDICAL HISTORY   has a past medical history of BPH (benign " "prostatic hyperplasia); Dental disorder; Hard of hearing; and Memory loss.    SOCIAL HISTORY  Social History     Social History Main Topics   • Smoking status: Former Smoker     Packs/day: 0.50     Years: 25.00     Types: Cigarettes   • Smokeless tobacco: Never Used   • Alcohol use Yes      Comment: occ   • Drug use: No   • Sexual activity: Not Currently     History   Drug Use No       SURGICAL HISTORY   has a past surgical history that includes other abdominal surgery (1940`s); other orthopedic surgery; cystoscopy (1/14/2013); and trans urethral resection prostate (1/14/2013).    CURRENT MEDICATIONS  Home Medications     Reviewed by Anisa Suggs R.N. (Registered Nurse) on 01/25/18 at 2147  Med List Status: Complete   Medication Last Dose Status   tamsulosin (FLOMAX) 0.4 MG capsule 1/24/2018 Active                ALLERGIES  Allergies   Allergen Reactions   • Pcn [Penicillins] Hives     RXN=unknown   • Sulfa Drugs Unspecified     RXN=unknown and unknown reaction       PHYSICAL EXAM  VITAL SIGNS: /89   Pulse 80   Temp 37.3 °C (99.1 °F)   Resp 16   Ht 1.981 m (6' 6\")   Wt 83.5 kg (184 lb)   SpO2 93%   BMI 21.26 kg/m²   Pulse ox interpretation: I interpret this pulse ox as normal.  Constitutional: Alert in no apparent distress.  HENT: No signs of trauma, Bilateral external ears normal, Nose normal.   Eyes: Conjunctiva normal, Non-icteric.   Neck: Normal range of motion, Supple, No stridor.   Lymphatic: No lymphadenopathy noted.   Cardiovascular: Regular rate and rhythm, no murmurs.   Thorax & Lungs:   Decreased air movement in the bilateral bases,, No respiratory distress, No wheezing, No chest tenderness,  No visible bruising, no palpable bony deformity.  Abdomen: Bowel sounds normal, Soft, No tenderness, No masses, No pulsatile masses. No peritoneal signs.  Skin: Warm, Dry, No erythema, No rash.   Back:   No bruising or bony deformity  Extremities: Intact distal pulses, No edema, No " cyanosis.  Musculoskeletal: Good range of motion in all major joints. No or major deformities noted.   Neurologic: Alert , Normal motor function, Normal sensory function, No focal deficits noted.   Psychiatric: Affect normal, Judgment normal, Mood normal.     DIAGNOSTIC STUDIES / PROCEDURES    EKG  Interpreted by me    Interpretive Statements   SINUS RHYTHM, Rate of 80  SUPRAVENTRICULAR BIGEMINY   PROBABLE LEFT ATRIAL ABNORMALITY   LEFT BUNDLE BRANCH BLOCK   Compared to ECG 01/09/2013 09:33:24   No significant changes     LABS  Labs Reviewed   CBC WITH DIFFERENTIAL - Abnormal; Notable for the following:        Result Value    WBC 16.6 (*)     RBC 4.47 (*)     Hemoglobin 13.1 (*)     Hematocrit 40.8 (*)     MCHC 32.1 (*)     Neutrophils-Polys 84.20 (*)     Lymphocytes 5.30 (*)     Neutrophils (Absolute) 13.94 (*)     Lymphs (Absolute) 0.87 (*)     Monos (Absolute) 1.58 (*)     All other components within normal limits   URINALYSIS - Abnormal; Notable for the following:     Ketones Trace (*)     Protein 30 (*)     Nitrite Positive (*)     Leukocyte Esterase Trace (*)     Occult Blood Small (*)     All other components within normal limits    Narrative:     Indication for culture:->Emergency Room Patient   BASIC METABOLIC PANEL - Abnormal; Notable for the following:     Glucose 106 (*)     Bun 31 (*)     Creatinine 1.56 (*)     All other components within normal limits   ESTIMATED GFR - Abnormal; Notable for the following:     GFR If  51 (*)     GFR If Non  42 (*)     All other components within normal limits   WESTERGREN SED RATE - Abnormal; Notable for the following:     Sed Rate Westergren 34 (*)     All other components within normal limits   URINE MICROSCOPIC (W/UA) - Abnormal; Notable for the following:     WBC 2-5 (*)     RBC 0-2 (*)     All other components within normal limits    Narrative:     Indication for culture:->Emergency Room Patient   CBC WITH DIFFERENTIAL - Abnormal;  "Notable for the following:     WBC 13.5 (*)     RBC 4.16 (*)     Hemoglobin 12.5 (*)     Hematocrit 37.3 (*)     MCHC 33.5 (*)     Neutrophils-Polys 82.00 (*)     Lymphocytes 6.60 (*)     Neutrophils (Absolute) 11.06 (*)     Lymphs (Absolute) 0.89 (*)     Monos (Absolute) 1.40 (*)     All other components within normal limits    Narrative:     Fasting   COMP METABOLIC PANEL - Abnormal; Notable for the following:     Bun 29 (*)     Calcium 8.3 (*)     Albumin 2.9 (*)     All other components within normal limits    Narrative:     Fasting   ESTIMATED GFR - Abnormal; Notable for the following:     GFR If Non  57 (*)     All other components within normal limits    Narrative:     Fasting   URINE CULTURE(NEW)    Narrative:     Indication for culture:->Emergency Room Patient   BLOOD CULTURE    Narrative:     Per Hospital Policy: Only change Specimen Src: to \"Line\" if  specified by physician order.   BLOOD CULTURE    Narrative:     Per Hospital Policy: Only change Specimen Src: to \"Line\" if  specified by physician order.   LACTIC ACID   LACTIC ACID   CULTURE RESPIRATORY W/ GRM STN    Narrative:     Preferably before first antibiotic dose - add Gram stain if  indicated   TSH   MAGNESIUM   INFLUENZA A/B BY PCR    Narrative:     Preferably before first antibiotic dose - add Gram stain if  indicated   LIPID PROFILE    Narrative:     Fasting     All labs reviewed by me.    RADIOLOGY  Echocardiogram Comp W/O Cont   Final Result      Carotid Duplex ([facility] Only)         [id number] (W/O CXR)   Final Result         1.  Bilateral third rib lytic changes, concerning for lytic lesion. Further evaluation with CT of the chest for further characterization.   2.  No acute rib fracture identified.   3.  Pulmonary edema and/or infiltrates.   4.  Atherosclerosis   5.  Cardiomegaly      DX-HIP-UNILATERAL-WITH PELVIS-1 VIEW LEFT   Final Result         1.  Degenerative changes of the spine.   2.  No " acute traumatic bony injury identified      US-RENAL   Final Result         1.  Nonvisualization of the left ureteral jet, otherwise unremarkable renal ultrasound.      CT-HEAD W/O   Final Result      1.  No acute abnormality.   2.  Mild cerebral atrophy.   3.  Mild chronic microvascular ischemic disease.      DX-CHEST-LIMITED (1 VIEW)   Final Result         Diffuse interstitial prominence could relate to mild edema or viral infection.      Patchy airspace opacities in the bilateral lower lobes, left more than right, atelectasis versus consolidation.        The radiologist's interpretation of all radiological studies have been reviewed by me.    COURSE & MEDICAL DECISION MAKING  Nursing notes, VS, PMSFHx reviewed in chart.     1:20 PM Patient seen and examined at bedside. Differential diagnosis includes but is not limited to  Ground-level fall, rib fractures, pulmonary contusion, pneumonia, urinary tract infection, failure to thrive.. Ordered for   Laboratory testing, chest x-ray, and EKG to evaluate. Patient will be treated with  Supplemental oxygen, and supportive care and as needed for his symptoms.     2:30 PM  This patient's laboratory tests  Initially, showed a leukocytosis of 16.6.  There may be a mild urinary tract infection, but his chest x-ray more convincingly shows possible bilateral pneumonia, which may account for his chest discomfort and malaise and weight loss.  There is no evidence of rib fracture, though given his age and frailty, CT may be more sensitive.  This testing will be deferred to the admitting hospitalist.  Nevertheless, given the patient's leukocytosis and pulmonary infiltrates, I started antibiotics and fluid resuscitation, and we will page the hospitalist for admission.    2:50 PM I spoke with Dr. Martin who agrees to admit.    DISPOSITION:  Patient will be admitted to   The hospitalist service in  guarded condition.      FINAL IMPRESSION  1.  Pneumonia  2.  Leukocytosis  3.   Adult failure to thrive

## 2018-01-25 NOTE — PROGRESS NOTES
"cc:  Recent fall    Subjective:     HPI:     Mikey Kelly is a 89 y.o. male here to discuss the evaluation and management of:    1. History of recent fall  Patient states that he thinks he fell approximately 2 days ago. Unclear if he fell forward or backward. Patient just states that he \"hurts all over\" especially in his chest/ribs. Patient states that he might have hit his head \"a little\". Denies any loss of consciousness. States that his chest and his ribs hurt especially when he is coughing and moving around. States that his legs hurt, especially his left leg is sore. Denies any fevers, nausea or vomiting, abdominal pain, dizziness or headache.    2. Shortness of breath and cough  Patient states that he is having shortness of breath and he is having trouble getting in a deep breath. getting very short of breath while walking. Having a hard time catching his breath and going up his stairs at home (10 stairs). States his cough has gotten worse and that he is coughing up some phlegm.It hurts him to cough. Denies coughing up any blood. Denies feeling feverish at home.    3. Chest and rib pain  Patient states that his ribs have been hurting him for several days. Thinks its from the fall he recently had. Hurting when he moves and coughs. Hard to sit up.    5. Weight loss  Patient states that he has been losing weight continuously over the last several months. States that he has no appetite. States that he used to eat pretty regularly and healthy and decent meals however has not been doing so lately. States yesterday he had. Has had nothing today. Used to be 220 pounds, now 184 pounds today.    6. Recent UTI  Patient states that he completed his entire antibiotic course as previous prescribed to him a few weeks ago. Denies any pain in his pelvic area and/or painful urination, blood in his urine or fevers.        ROS:  Denies any Headache, Blurred Vision, forgetful Confusion Positive for Chest pain,  Positive for " "Shortness of breath,  Abdominal pain, Changes of bowel or bladder, Lower ext edema, Fevers, Nights sweats, Weight loss, Focal weakness or numbness.  All other systems are negative.        Current Outpatient Prescriptions:   •  tamsulosin (FLOMAX) 0.4 MG capsule, Take 0.4 mg by mouth every day., Disp: , Rfl:     Allergies   Allergen Reactions   • Pcn [Penicillins] Hives   • Sulfa Drugs        Past Medical History:   Diagnosis Date   • BPH (benign prostatic hyperplasia)    • Dental disorder     upper   • Hard of hearing    • Memory loss      Past Surgical History:   Procedure Laterality Date   • CYSTOSCOPY  1/14/2013    Performed by Bradley Sales M.D. at SURGERY [facility]   • TRANS URETHRAL RESECTION PROSTATE  1/14/2013    Performed by Bradley Sales M.D. at SURGERY [facility]   • OTHER ABDOMINAL SURGERY  1940`s    hernia repair, appy   • OTHER ORTHOPEDIC SURGERY      right  rotator cuff repair     History reviewed. No pertinent family history.  Social History     Social History   • Marital status: Single     Spouse name: N/A   • Number of children: N/A   • Years of education: N/A     Occupational History   • Not on file.     Social History Main Topics   • Smoking status: Former Smoker     Packs/day: 0.50     Years: 25.00     Types: Cigarettes   • Smokeless tobacco: Never Used   • Alcohol use Yes      Comment: occ   • Drug use: No   • Sexual activity: Not Currently     Other Topics Concern   • Not on file     Social History Narrative   • No narrative on file       Objective:     Vitals: /84   Pulse 86   Temp 37.9 °C (100.2 °F)   Resp (!) 44   Ht 1.956 m (6' 5\")   Wt 83.7 kg (184 lb 9.6 oz)   SpO2 93%   BMI 21.89 kg/m²    General: He is alert and oriented to him self and place, not oriented to date or time.  HEENT: Normocephalic.  Neck supple.  No thyromegaly or masses palpated. No cervical or supraclavicular lymphadenopathy.  Heart: Distant heart tones   Respiratory: Labored breathing " increased respiratory rate at 44 count, shallow and diminished.  Abdomen: Non-distended, soft, non-tender, no guarding/rebound.  Skin: Warm, dry, no rash, abrasion on right knee  Musculoskeletal: Gait is unsteady. Rigid, guarded and slow gait. Using wheelchair at this time. Moves all extremities well.  Extremities: No leg edema.    Neurological: No tremors, sensation grossly intact  Psych:  Affect/mood is normal, judgement is good, memory is impaired, somewhat poor historian, grooming is appropriate.    Assessment/Plan:     Mikey was seen today for follow-up and fall.    Diagnoses and all orders for this visit:    History of recent fall  Recent fall approximately 2 days ago.Not witnessed. +chest pain, dyspnea and cough.   Patient has one abrasion on right knee. Rule out blunt chest injury do to fall. Sending to Emergency Room for further workup. Patient and daughter agree to plan.    Shortness of breath  Cough  Intercostal pain  Slightly febrile at 100.2 F and presents with labored breathing, cough and guarded chest pain in clinic. Cough has worsened since last visit. Denies any night sweats, dizziness, fevers, or colored sputum. Reports poor appetite. Suspicious for rib injury, and possible pneumonia/pleural effusion. Sending to ER for further workup.     Adult failure to thrive  Weight loss, unintentional  Patient has lost approximately 10 pounds since last visit. Per daughter last few months patient has been loosing weight, was 220 now 184lb.  Has poor appetite, failure to thrive.    History of UTI  Recent UTI early January. Placed on Ceftidnir, patient reports that he took entire course. Denies any dysuria, fever, blood in his urine, or pelvic pain. UTI should be ruled out as patient does report self catheterizing daily. Sending to ER for further workup.          Health care Maintenance:     Return if symptoms worsen or fail to improve.    Discussed patient case with Dr. Lobo and agreed with plan for  patient to be further worked up at the Emergency room for possible pneumonia/pleuraleffusiona, possible UTI and blunt chest injury.      Terri PFEIFFER.

## 2018-01-26 PROBLEM — M89.9 LYTIC LESION OF BONE ON X-RAY: Status: ACTIVE | Noted: 2018-01-01

## 2018-01-26 PROBLEM — D72.829 LEUKOCYTOSIS: Status: ACTIVE | Noted: 2018-01-01

## 2018-01-26 PROBLEM — Z66 DNR (DO NOT RESUSCITATE): Status: ACTIVE | Noted: 2018-01-01

## 2018-01-26 PROBLEM — J96.01 ACUTE HYPOXEMIC RESPIRATORY FAILURE (HCC): Status: ACTIVE | Noted: 2018-01-01

## 2018-01-26 PROBLEM — N39.0 UTI (URINARY TRACT INFECTION): Status: ACTIVE | Noted: 2018-01-01

## 2018-01-26 NOTE — PROGRESS NOTES
Pt up to unit via [facility] with daughter at bedside. Pt walked from [facility] to hospital bed with 2x assist and personal cane. Treaded socks in place. Tele monitor in place. Monitor room notified. Personal belongings brought up with pt.

## 2018-01-26 NOTE — PROGRESS NOTES
2 RN skin check done with Mian OLIVEIRA.     Ears red and slow to shahida, ear pad protectors in place.  Elbows pink and blanching.  Sacrum red and blanching.  Abrasion to R knee, pt states from fall.  Heels dry and calloused. L pinky toenail missing.    No other skin breakdown at this time.

## 2018-01-26 NOTE — CARE PLAN
Problem: Safety  Goal: Will remain free from injury  Outcome: PROGRESSING AS EXPECTED  Pt educated about use of call light and bed alarm when getting out of bed. Call light within reach. Bed alarm in use. Pt verbalized understanding and calls appropriately. Treaded socks in place. Bed in low and locked position. Appropriate sign outside of room.       Problem: Knowledge Deficit  Goal: Knowledge of disease process/condition, treatment plan, diagnostic tests, and medications will improve  Outcome: PROGRESSING AS EXPECTED  Discussed POC with pt. Answered all questions appropriately. White board updated. All medications and interventions explained before performed. Pt verbalized understanding.

## 2018-01-26 NOTE — PROGRESS NOTES
Assumed care report received. Assessment completed. AOx4. Pt resting in bed complains of pain 5/10, medicated see MAR. No other complaints at this time. Plan of care discussed, verbalized understanding. Fall precautions in place. Call light within reach. White board updated. Bed alarm in use.

## 2018-01-26 NOTE — PROGRESS NOTES
MD paged regarding bladder scan. Informed MD that pt does his own straight caths at home. MD ordered bladder scans Q6 with straight caths >400. Straight cath performed. 550ml output.

## 2018-01-27 PROBLEM — R91.8 PULMONARY NODULES: Status: ACTIVE | Noted: 2018-01-01

## 2018-01-27 PROBLEM — I31.39 PERICARDIAL EFFUSION: Status: ACTIVE | Noted: 2018-01-01

## 2018-01-27 PROBLEM — J90 PLEURAL EFFUSION: Status: ACTIVE | Noted: 2018-01-01

## 2018-01-27 PROBLEM — I71.20 THORACIC AORTIC ANEURYSM WITHOUT RUPTURE (HCC): Status: ACTIVE | Noted: 2018-01-01

## 2018-01-27 PROBLEM — R79.89 ELEVATED TROPONIN: Status: ACTIVE | Noted: 2018-01-01

## 2018-01-27 PROBLEM — I31.9 PERICARDITIS: Status: ACTIVE | Noted: 2018-01-01

## 2018-01-27 PROBLEM — R07.9 CHEST PAIN: Status: ACTIVE | Noted: 2018-01-01

## 2018-01-27 PROBLEM — R59.9 ADENOPATHY: Status: ACTIVE | Noted: 2018-01-01

## 2018-01-27 NOTE — CARE PLAN
Problem: Safety  Goal: Will remain free from injury  Outcome: PROGRESSING AS EXPECTED  Safety education provided. Bed alarm in use.    Problem: Venous Thromboembolism (VTW)/Deep Vein Thrombosis (DVT) Prevention:  Goal: Patient will participate in Venous Thrombosis (VTE)/Deep Vein Thrombosis (DVT)Prevention Measures  Outcome: PROGRESSING AS EXPECTED  PT receiving SQ heparin

## 2018-01-27 NOTE — CARE PLAN
Problem: Safety  Goal: Will remain free from injury  Outcome: PROGRESSING AS EXPECTED  Fall risk assessed every shift and fall precautions in place.  Pt educated to not get up without assistance.  Verbalized understanding.       Problem: Knowledge Deficit  Goal: Knowledge of disease process/condition, treatment plan, diagnostic tests, and medications will improve  Pt educated regarding activity, diet, meds and plan of care. Verbalized understanding.

## 2018-01-27 NOTE — RESPIRATORY CARE
COPD EDUCATION by COPD CLINICAL EDUCATOR  1/27/2018 at 7:38 AM by Daphne Sands     Patient reviewed by COPD education team. Patient does not qualify for COPD program.

## 2018-01-27 NOTE — PROGRESS NOTES
PT diaz catheter is not draining. Bladder scan 530. Notified Dr. Perez.  Received order to D/C current diaz and insert coude.

## 2018-01-27 NOTE — PROGRESS NOTES
[facility] Hospitalist Progress Note    Date of Service: 2018    Chief Complaint  89 y.o. male admitted 2018 with Fall, Hip pain, cough    Interval Problem Update  Patient seen and evaluated on rounds  Fatigue and lethargy noted  C/O generalized rib pain  Ongoing cough, SOB and sputum production  Asked nursing to place diaz  UTI noted  Fall / Syncope work up being pursued    Consultants/Specialty  None    Disposition  Obtain PT/OT evaluation for disposition needs        Review of Systems   Constitutional: Positive for malaise/fatigue. Negative for chills, diaphoresis and fever.   HENT: Positive for hearing loss. Negative for tinnitus.    Eyes: Negative for blurred vision and double vision.   Respiratory: Positive for cough, sputum production and shortness of breath. Negative for hemoptysis and wheezing.    Cardiovascular: Positive for chest pain (Rib pain). Negative for palpitations, orthopnea, claudication, leg swelling and PND.   Gastrointestinal: Negative for abdominal pain, blood in stool, constipation, diarrhea, heartburn, melena, nausea and vomiting.   Genitourinary:        Urinary retention requiring ISC   Musculoskeletal: Positive for falls. Negative for back pain, joint pain, myalgias and neck pain.   Skin: Negative for itching and rash.   Neurological: Positive for weakness. Negative for dizziness and headaches.   Psychiatric/Behavioral: Negative for depression and suicidal ideas.      Physical Exam  Laboratory/Imaging   Hemodynamics  Temp (24hrs), Av.2 °C (99 °F), Min:36.3 °C (97.4 °F), Max:37.7 °C (99.8 °F)   Temperature: 36.3 °C (97.4 °F)  Pulse  Av.1  Min: 72  Max: 88 Heart Rate (Monitored): 85  Blood Pressure : 104/63, NIBP: 138/81      Respiratory      Respiration: 16, Pulse Oximetry: 95 %, O2 Daily Delivery Respiratory : Silicone Nasal Cannula     PEP/CPT Method: Positive Airway Pressure Device, Work Of Breathing / Effort: Mild  RUL Breath Sounds: Diminished, RML Breath Sounds:  Diminished, RLL Breath Sounds: Diminished, OBED Breath Sounds: Diminished, LLL Breath Sounds: Diminished    Fluids    Intake/Output Summary (Last 24 hours) at 01/26/18 1942  Last data filed at 01/26/18 1300   Gross per 24 hour   Intake              240 ml   Output             1050 ml   Net             -810 ml       Nutrition  Orders Placed This Encounter   Procedures   • Diet Order     Standing Status:   Standing     Number of Occurrences:   1     Order Specific Question:   Diet:     Answer:   Regular [1]     Physical Exam   Constitutional: He is oriented to person, place, and time. He appears well-developed and well-nourished. No distress.   HENT:   Head: Normocephalic.   Mouth/Throat: Oropharynx is clear and moist. No oropharyngeal exudate.   Eyes: Conjunctivae are normal. Pupils are equal, round, and reactive to light. No scleral icterus.   Neck: Normal range of motion. No JVD present.   Cardiovascular: Normal rate and regular rhythm.    Murmur heard.  Pulmonary/Chest: No stridor. No respiratory distress. He has no wheezes. He has rales.   Abdominal: Soft. Bowel sounds are normal. He exhibits no distension. There is no tenderness. There is no rebound and no guarding.   Musculoskeletal: He exhibits no edema, tenderness or deformity.   Neurological: He is alert and oriented to person, place, and time. He has normal reflexes. No cranial nerve deficit.   Skin: Skin is warm and dry. He is not diaphoretic.   Psychiatric: He has a normal mood and affect. His behavior is normal. Judgment and thought content normal.       Recent Labs      01/25/18   1344  01/26/18 0226   WBC  16.6*  13.5*   RBC  4.47*  4.16*   HEMOGLOBIN  13.1*  12.5*   HEMATOCRIT  40.8*  37.3*   MCV  91.3  89.7   MCH  29.3  30.0   MCHC  32.1*  33.5*   RDW  49.5  47.9   PLATELETCT  256  225   MPV  9.9  10.5     Recent Labs      01/25/18   1344  01/26/18   0226   SODIUM  136  136   POTASSIUM  4.0  3.7   CHLORIDE  105  105   CO2  22  20   GLUCOSE  106*  96    BUN  31*  29*   CREATININE  1.56*  1.19   CALCIUM  9.2  8.3*             Recent Labs      01/26/18   0226   TRIGLYCERIDE  57   HDL  42   LDL  48          Assessment/Plan     Pneumonia- (present on admission)   Assessment & Plan    Suspected  Obtaining CT Chest for further evaluation  Continue empiric abx therapy at this time  De escalate as clinically appropriate        Lytic lesion of bone on x-ray- (present on admission)   Assessment & Plan    Rib on CXRAY  Obtain further evaluation with CT Chest  Obtain SPEP / UPEP        UTI (urinary tract infection)- (present on admission)   Assessment & Plan    This is 2/2 chronic urinary retention and related to ISC at home  Recent culture with E.Coli  Complicated UTI  Continue ceftriaxone  For now place diaz catheter  Plan at least 7 days of therapy  Outpatient urology evaluation recommended        Syncope- (present on admission)   Assessment & Plan    Continue telemetry monitoring  Cycle troponins, TTE, Carotid duplex  PT evaluation        Leukocytosis- (present on admission)   Assessment & Plan    Without sepsis  Related to infectious etiologies  Continue to montior        BPH (benign prostatic hyperplasia)- (present on admission)   Assessment & Plan    With urinary retention requiring ISC  Place diaz catheter for now  Hx TURP  Increase Flomax to 0.8 mg  Add finasteride  Outpatient urology follow up recommended        Fall- (present on admission)   Assessment & Plan    No injury noted on Imaging evaluation  PT/OT evaluation  Post acute placement as clinically appropriate        LBBB (left bundle branch block)- (present on admission)   Assessment & Plan    Chronic        CKD (chronic kidney disease) stage 3, GFR 30-59 ml/min- (present on admission)   Assessment & Plan    Monitor renal function / Avoid nephrotoxins and dose medications renally            Reviewed items::  Labs reviewed, Medications reviewed and Radiology images reviewed  Diaz catheter::  Urinary Tract  Retention or Urinary Tract Obstruction  DVT prophylaxis pharmacological::  Heparin  DVT prophylaxis - mechanical:  SCDs  Ulcer Prophylaxis::  Not indicated  Antibiotics:  Treating active infection/contamination beyond 24 hours perioperative coverage

## 2018-01-27 NOTE — THERAPY
"Occupational Therapy Evaluation completed.   Functional Status: Mod A supine > EOB, min A sit to stand, max A LB dressing   Plan of Care: Will benefit from Occupational Therapy 3 times per week  Discharge Recommendations:  Equipment: Will Continue to Assess for Equipment Needs. Post-acute therapy to be determined.     See \"Rehab Therapy-Acute\" Patient Summary Report for complete documentation.    89 y.o. male with h/o BPH (self-caths at home), recent UTI, who had GLF 2 days prior to admission, who presented with L hip and rib pain. Pt negative for fxs. Dx with PNA, LBBB, syncope. Pt seen for OT eval. Completed supine > EOB, sit to stands, amb in hallway with FWW. Pt presents with generalized weakness, balance impairment, activity intolerance, decreased BLE ROM (for LB ADL). Pt is below baseline function for ADL and functional mobility (normally independent). He would benefit from acute OT to maximize functional independence and safety.     "

## 2018-01-27 NOTE — PROGRESS NOTES
Pt in bed, no complaints this morning, updated on POC for the day, call light in reach, bed in lowest position.

## 2018-01-27 NOTE — THERAPY
"Speech Language Therapy Clinical Swallow Evaluation completed.  Functional Status: Patient reporting significant pain and pointing to lower sides of his chest. He has had prolonged cough and poor PO intake for 1 to 2 months. Clinical swallow evaluation at bedside did not reveal any overt signs of aspiration or dysphagia. Patient denies having any symptoms of aspiration or dysphagia. Recommend to continue regular diet, thins liquids. However, the patient reports minimal movement and only using IS 1x a day and rolls his eyes when discussing need for therapy and movement. Daughter at bedside and able to encourage the patient to do exercises. He completed IS x3 which triggered cough and sputum production. Due to patient's current apprehension to recommendations he is at risk for decline. SLP to follow closely. Please hold PO with any signs of difficulty.   Recommendations - Diet: Diet / Liquid Recommendation: Regular, Thin Liquid                          Strategies: Monitor during meals, Assistance needed for meal tray set-up and Head of Bed at 90 Degrees                          Medication Administration: Medication Administration : Whole with Liquid Wash  Plan of Care: Will benefit from Speech Therapy 3 times per week  Post-Acute Therapy: Discharge to a transitional care facility for continued skilled therapy services. and Discharge to home with outpatient or home health for additional skilled therapy services.    See \"Rehab Therapy-Acute\" Patient Summary Report for complete documentation.   "

## 2018-01-27 NOTE — PROGRESS NOTES
Assumed care, patient resting comfortably in bed. Bed locked and in lowest position. Call bell and personal items in reach. Whiteboard updated.

## 2018-01-27 NOTE — THERAPY
"Physical Therapy Evaluation completed.   Bed Mobility:  Supine to Sit: Minimal Assist (slightly raised HOB; use of railing)  Transfers: Sit to Stand: Minimal Assist (x 2 from low surface; min A x 1 from high, with FWW)  Gait: Level Of Assist: Contact Guard Assist with Front-Wheel Walker       Plan of Care: Will benefit from Physical Therapy 3 times per week  Discharge Recommendations: Equipment: Will Continue to Assess for Equipment Needs.     Pt presents with impaired activity tolerance, LE endurance, gait mechanics and pain s/p GLF with PNA. Pt is most limited by left rib pain. In current condition would benefit from SNF as needing assist for bed mobility and standing and lives alone. Though prior level of function was quite high and may do well with continued mobility, will follow.    See \"Rehab Therapy-Acute\" Patient Summary Report for complete documentation.     "

## 2018-01-27 NOTE — DIETARY
Nutrition services: Day 1 of admit.  89 yo male admitted following a fall with pneumonia.  Pt is noted on admitting screen to have had poor po intake and weight loss of unknown amount PTA.     Assessment:  1) pt was started on a regular diet last night.  No documentation of meal intake to report on.  2) BMI 21.26 and albumin 2.9 on admit.    3) albumin may be decreased secondary to recent UTI.     Recommendations/Plan:  1) encourage intake of meals. If pt taking less than 50% of most meals order supplements to better meet nutritional needs.  2) document intake of all meals and supplements as % taken in ADL's to provide interdisciplinary communication across all shifts   3) monitor daily weights for adequacy of nutrition and fluid balance  6) Nutrition rep will continue to see patient for ongoing meal and snack preferences.

## 2018-01-28 PROBLEM — I26.99 PULMONARY EMBOLISM (HCC): Status: ACTIVE | Noted: 2018-01-01

## 2018-01-28 PROBLEM — I82.409 DVT (DEEP VENOUS THROMBOSIS) (HCC): Status: ACTIVE | Noted: 2018-01-01

## 2018-01-28 PROBLEM — C79.9 METASTATIC NEOPLASTIC DISEASE (HCC): Status: ACTIVE | Noted: 2018-01-01

## 2018-01-28 NOTE — PROGRESS NOTES
Cardiology Progress Note               Author: Belle Trammell Date & Time created: 2018  9:06 AM     Consultation for      Admitted with recent fall, hip pain, weakness, cough, found to have CAP      History of BPH, TURP in , UTI      Labs reviewed  Sodium, potassium, creatinine stable  Troponin peaked at 0.26    CRP 12      BP =124/64  HR =90's  EKG with left bundle (was seen on EKG in )      Echocardiogram 18, LVEF 50%, severe concentric LV hypertrophy, trivial pericardial effusion, no significant valvular abnormalities      CT abdomen 18    Right hepatic metastasis  Borderline gastrohepatic ligament lymph node could be reactive or malignant  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 Additional lucencies are suspicious as well. Recommend bone scan to further analyze (if PET/CT is not performed)      Review of Systems   Respiratory: Positive for shortness of breath. Negative for cough.    Cardiovascular: Negative for palpitations, orthopnea, claudication, leg swelling and PND.        Bilateral below the rib cage pain, patient stated he had this prior to fall, but did not seek medical therapy        Physical Exam   Constitutional: He is oriented to person, place, and time. He appears well-developed.   Eyes: Conjunctivae are normal.   Neck: No JVD present. No thyromegaly present.   Cardiovascular: Normal rate and regular rhythm.    Pulses:       Carotid pulses are 2+ on the right side, and 2+ on the left side.       Radial pulses are 2+ on the right side, and 2+ on the left side.   Pulmonary/Chest: He has no wheezes.   Abdominal: Soft.   Musculoskeletal: He exhibits no edema.   Neurological: He is alert and oriented to person, place, and time.   Skin: Skin is warm and dry.       Hemodynamics:  Temp (24hrs), Av.7 °C (98.1 °F), Min:36.1 °C (96.9 °F), Max:37.1 °C (98.8 °F)  Temperature: 37.1 °C (98.8 °F)  Pulse  Av.3  Min: 55  Max: 91   Blood Pressure : 124/64     Respiratory:     Respiration: 18, Pulse Oximetry: 91 %, O2 Daily Delivery Respiratory : Silicone Nasal Cannula     Given By:: Mouthpiece, PEP/CPT Method: Positive Airway Pressure Device, Work Of Breathing / Effort: Mild  RUL Breath Sounds: Clear, RML Breath Sounds: Clear, RLL Breath Sounds: Fine Crackles, OBED Breath Sounds: Clear, LLL Breath Sounds: Fine Crackles  Fluids:        GI/Nutrition:  Orders Placed This Encounter   Procedures   • Diet Order     Standing Status:   Standing     Number of Occurrences:   1     Order Specific Question:   Diet:     Answer:   Regular [1]     Lab Results:  Recent Labs      01/26/18 0226 01/26/18 2342 01/28/18 0347   WBC  13.5*  14.1*  11.6*   RBC  4.16*  4.52*  4.44*   HEMOGLOBIN  12.5*  13.8*  12.9*   HEMATOCRIT  37.3*  40.8*  40.4*   MCV  89.7  90.3  91.0   MCH  30.0  30.5  29.1   MCHC  33.5*  33.8  31.9*   RDW  47.9  48.3  48.0   PLATELETCT  225  296  298   MPV  10.5  10.2  10.4     Recent Labs      01/26/18 0226 01/26/18 2343 01/28/18 0347   SODIUM  136  137  136   POTASSIUM  3.7  3.9  3.7   CHLORIDE  105  106  105   CO2  20  22  22   GLUCOSE  96  103*  97   BUN  29*  26*  19   CREATININE  1.19  1.15  1.07   CALCIUM  8.3*  8.0*  8.3*         Recent Labs      01/27/18   1032   BNPBTYPENAT  350*     Recent Labs      01/26/18 2343 01/27/18 0346 01/27/18   1032  01/28/18 0347   TROPONINI  0.26*  0.24*   --   0.15*   BNPBTYPENAT   --    --   350*   --      Recent Labs      01/26/18 0226   TRIGLYCERIDE  57   HDL  42   LDL  48         Medical Decision Making, by Problem:  Active Hospital Problems    Diagnosis   • Acute hypoxemic respiratory failure (CMS-HCC) [J96.01]   • Chest pain [R07.9]   • Pleural effusion [J90]   • Adenopathy [R59.1]   • Pericardial effusion [I31.3]   • Elevated troponin [R74.8]   • Pericarditis [I31.9]   • UTI (urinary tract infection) [N39.0]   • Syncope [R55]   • Pneumonia [J18.9]   • Pulmonary nodules [R91.8]   • Thoracic aortic aneurysm without  rupture (CMS-HCC) [I71.2]   • Lytic lesion of bone on x-ray [M89.9]   • Leukocytosis [D72.829]   • DNR (do not resuscitate) [Z66]   • LBBB (left bundle branch block) [I44.7]   • Fall [W19.XXXA]   • BPH (benign prostatic hyperplasia) [N40.0]   • CKD (chronic kidney disease) stage 3, GFR 30-59 ml/min [N18.3]       Assessment/Plan:      Significant pain to lower rib cage bilaterally ,has been presented prior to fall per patient report , will discuss with Dr. Guzman, ct abdomen showed query malignancy, will defer to Dr. Guzman        Consultation for indeterminate elevation of troponin, no angina    Peaked at 0.26, trending down    Echocardiogram, LVEF 50%, abnormal paradoxical septal motion, no significant valve disease    No rhythm issues overnight    Left bundle branch block, chronic    Trivial pericardial effusion by recent echo, has some dyspnea with exertion, no chest pain    CAP, on Zithromax and Rocephin    Plan is to continue with the current medical therapy from cardiology stand              Reviewed items::  Medications reviewed and Labs reviewed

## 2018-01-28 NOTE — CARE PLAN
Problem: Safety  Goal: Will remain free from injury  Outcome: PROGRESSING AS EXPECTED  Patient understands purposeful hourly rounding and addressing the 4 P's - pain, position, potty, and placement.    Problem: Infection  Goal: Will remain free from infection  Outcome: PROGRESSING AS EXPECTED  Patient will demonstrate proper hand hygiene.

## 2018-01-28 NOTE — CARE PLAN
Problem: Mobility  Goal: Risk for activity intolerance will decrease  Outcome: PROGRESSING SLOWER THAN EXPECTED  Patient needs to be motivated to walk.     Problem: Skin Integrity  Goal: Risk for impaired skin integrity will decrease  Outcome: PROGRESSING AS EXPECTED  Skin intact

## 2018-01-28 NOTE — PROGRESS NOTES
Received bedside shift report from night RN, Neetu.  Pt is AOx4.  Discussed POC and goal for the day with patient and he verbalized understanding.  Eduated pt on white board and call light.  Bed locked and in lowest position with bilateral bedside rails up.  Will resume care and continue to monitor.

## 2018-01-28 NOTE — PROGRESS NOTES
Diaz not draining urine, attempted to replace diaz, was not able to get steady urine flow, straight cath done 450ml of eli urine drained

## 2018-01-28 NOTE — CONSULTS
DATE OF CONSULTATION: 01/27/18    CONSULT REQUESTED BY: Dr. Guzman    REASON FOR CONSULTATION: pericaldial effusion    HISTORY OF PRESENT ILLNESS:   Mikey Kelly is a 89 y.o. male with a history of bph who presents after a fall now with pain everywhere and significant bilateral diffuse chest pain.    He fell several days ago and injured his left hip and ribs.  Also developing a cough which is productive yellow and green sputum.  He is feeling ill and weak and not really eating so family  brought him into the emergency room.  Diagnosed with likely pneumonia.  In evaluation he had echo with ef 50, septal motion c/w lbbb and read as trivial effusion.  Ct noted moderate effusion.  Team called with ongoing cp to resolve if trivial or moderate and if further w/u indicated.  Complains now of svere discomfort.  Diffuse and bilateral.  Noting makes it better or worse.    ALLERGY:  Allergies   Allergen Reactions   • Pcn [Penicillins] Hives     RXN=unknown   • Sulfa Drugs Unspecified     RXN=unknown and unknown reaction       MEDICATIONS:    Current Facility-Administered Medications:   •  ipratropium-albuterol (DUONEB) nebulizer solution 3 mL, 3 mL, Nebulization, 4X/DAY, Desire Guzman M.D., Stopped at 01/27/18 2100  •  sodium chloride 3% nebulizer solution 3 mL, 3 mL, Nebulization, 4X/DAY (RT), Desire Guzman M.D., Stopped at 01/27/18 1015  •  colchicine (COLCRYS) tablet 0.6 mg, 0.6 mg, Oral, BID, Desire Guzman M.D., 0.6 mg at 01/27/18 2144  •  tamsulosin (FLOMAX) capsule 0.8 mg, 0.8 mg, Oral, QHS, Desire Guzman M.D., 0.8 mg at 01/27/18 2143  •  finasteride (PROSCAR) tablet 5 mg, 5 mg, Oral, QHS, Desire Guzman M.D., 5 mg at 01/27/18 2143  •  Pharmacy Consult Request 1 Each, 1 Each, Other, PRN, Markus Boyer M.D.  •  Respiratory Care per Protocol, , Nebulization, Continuous RT, Markus Boyer M.D.  •  heparin injection 5,000 Units, 5,000 Units, Subcutaneous, Q8HRS, Markus Boyer M.D., 5,000 Units at 01/27/18 2144  •   acetaminophen (TYLENOL) tablet 650 mg, 650 mg, Oral, Q6HRS PRN, Markus Boyer M.D., 650 mg at 01/27/18 1722  •  cefTRIAXone (ROCEPHIN) syringe 2 g, 2 g, Intravenous, Q24HRS, Markus Boyer M.D., 2 g at 01/27/18 1043  •  ondansetron (ZOFRAN) syringe/vial injection 4 mg, 4 mg, Intravenous, Q4HRS PRN, Markus Boyer M.D.  •  ondansetron (ZOFRAN ODT) dispertab 4 mg, 4 mg, Oral, Q4HRS PRN, Markus Boyer M.D.  •  azithromycin (ZITHROMAX) tablet 250 mg, 250 mg, Oral, DAILY, Markus Boyer M.D., 250 mg at 01/27/18 1042    PAST MEDICAL HISTORY:  Past Medical History:   Diagnosis Date   • BPH (benign prostatic hyperplasia)    • Dental disorder     upper   • Hard of hearing    • Memory loss      Past Surgical History:   Procedure Laterality Date   • CYSTOSCOPY  1/14/2013    Performed by Bradley Sales M.D. at [facility]   • TRANS URETHRAL RESECTION PROSTATE  1/14/2013    Performed by Bradley Sales M.D. at [facility]   • OTHER ABDOMINAL SURGERY  1940`s    hernia repair, appy   • OTHER ORTHOPEDIC SURGERY      right  rotator cuff repair         FAMILY HISTORY:  No family history on file.  No premature cad or sudden death    SOCIAL HISTORY:  History   Smoking Status   • Former Smoker   • Packs/day: 0.50   • Years: 25.00   • Types: Cigarettes   Smokeless Tobacco   • Never Used     History   Alcohol Use   • Yes     Comment: occ         REVIEW OF SYSTEMS:  Review of Systems   Constitutional: Positive for malaise/fatigue and weight loss. Negative for chills and fever.   Respiratory: Positive for cough, sputum production and shortness of breath.    Cardiovascular: Positive for chest pain.   Gastrointestinal:        Poor appetite   Genitourinary: Positive for frequency.   Musculoskeletal: Positive for back pain and joint pain.   Neurological: Positive for weakness. Negative for dizziness and loss of consciousness.   All other systems reviewed and are negative.      PHYSICAL  EXAMINATION:  Patient Vitals for the past 24 hrs:   BP Temp Pulse Resp SpO2   01/27/18 2319 122/63 36.6 °C (97.9 °F) 73 16 93 %   01/27/18 1919 127/70 36.1 °C (96.9 °F) 82 17 92 %   01/27/18 1540 106/53 36.8 °C (98.2 °F) 71 18 93 %   01/27/18 1430 - - 72 16 93 %   01/27/18 1151 - - 78 16 93 %   01/27/18 1140 110/65 37 °C (98.6 °F) 77 18 92 %   01/27/18 0859 - - 72 16 92 %   01/27/18 0730 128/69 36.7 °C (98.1 °F) 77 18 90 %   01/27/18 0416 144/69 37.3 °C (99.1 °F) 75 16 90 %       Physical Exam   Constitutional: He is oriented to person, place, and time. He appears well-developed and well-nourished. No distress.   HENT:   Head: Normocephalic and atraumatic.   Right Ear: External ear normal.   Left Ear: External ear normal.   Nose: Nose normal.   Mouth/Throat: Oropharynx is clear and moist.   Eyes: Conjunctivae and EOM are normal. Pupils are equal, round, and reactive to light. Right eye exhibits no discharge. Left eye exhibits no discharge. No scleral icterus.   Neck: Normal range of motion. Neck supple. No JVD present. No tracheal deviation present. No thyromegaly present.   Cardiovascular: Normal rate, regular rhythm, normal heart sounds and intact distal pulses.  Exam reveals no gallop and no friction rub.    No murmur heard.  Pulmonary/Chest: Effort normal and breath sounds normal. No stridor. No respiratory distress. He has no wheezes. He has no rales. He exhibits tenderness.   Unclear if really any thenderness as hurts everywhere   Abdominal: Soft. Bowel sounds are normal. He exhibits no distension. There is no tenderness. There is no rebound and no guarding.   Musculoskeletal: Normal range of motion. He exhibits no edema.   Neurological: He is alert and oriented to person, place, and time. No cranial nerve deficit. Coordination normal.   Skin: Skin is warm and dry. No rash noted. He is not diaphoretic. No erythema.   Psychiatric: He has a normal mood and affect. His behavior is normal. Judgment and thought  content normal.   Vitals reviewed.        DATA:  Lab Results   Component Value Date/Time    SODIUM 137 01/26/2018 11:43 PM    POTASSIUM 3.9 01/26/2018 11:43 PM    CHLORIDE 106 01/26/2018 11:43 PM    CO2 22 01/26/2018 11:43 PM    GLUCOSE 103 (H) 01/26/2018 11:43 PM    BUN 26 (H) 01/26/2018 11:43 PM    CREATININE 1.15 01/26/2018 11:43 PM    CREATININE 1.2 09/09/2008 09:51 AM      Lab Results   Component Value Date/Time    PROTHROMBTM 13.3 01/09/2013 09:31 AM    INR 0.99 01/09/2013 09:31 AM      Lab Results   Component Value Date/Time    WBC 14.1 (H) 01/26/2018 11:42 PM    RBC 4.52 (L) 01/26/2018 11:42 PM    HEMOGLOBIN 13.8 (L) 01/26/2018 11:42 PM    HEMATOCRIT 40.8 (L) 01/26/2018 11:42 PM    MCV 90.3 01/26/2018 11:42 PM    MCH 30.5 01/26/2018 11:42 PM    MCHC 33.8 01/26/2018 11:42 PM    MPV 10.2 01/26/2018 11:42 PM    NEUTSPOLYS 72.80 (H) 01/26/2018 11:42 PM    LYMPHOCYTES 13.60 (L) 01/26/2018 11:42 PM    MONOCYTES 11.40 01/26/2018 11:42 PM    EOSINOPHILS 1.20 01/26/2018 11:42 PM    BASOPHILS 0.30 01/26/2018 11:42 PM    trop indeterminate level 0.26    ECG personally evaluated and interpreted by me: lbbb frequent supraventricular ectopy.  No voltage alternans or low volts  Ct reveiwed by me and I would call effusion small on that- not inconsistent with the echo  ECHO: also reviewed the images and similar size appearance      ASSESSMENT:  Diffuse pain.  Small effusion.  Could be pericardial pain.  No tamponade  Would be somewhat diffiuclt to do percardiocentesis but could be done.    Fall was mechanical and not syncopal    PLAN:  Would try trial fo nsaid and colchicine to empirically treat pericarditis.

## 2018-01-28 NOTE — PROGRESS NOTES
[facility]    Date of Service: 2018    Chief Complaint  89 y.o. male admitted 2018 with Fall, Hip pain, cough    Interval Problem Update  Patient seen and evaluated on rounds  Fatigue and lethargy noted  C/O generalized rib pain / CP  Elevated troponin, cardiology consultation requested  Ongoing cough, SOB and sputum production  PNA evident on imaging, patient on abx  Adenopathy is noted, further evaluation requested  Started colchicine  Aggressive BD regimen / RT care at this time    Consultants/Specialty  None    Disposition  Obtain PT/OT evaluation for disposition needs        Review of Systems   Constitutional: Positive for malaise/fatigue. Negative for chills, diaphoresis and fever.   HENT: Positive for hearing loss. Negative for tinnitus.    Eyes: Negative for blurred vision and double vision.   Respiratory: Positive for cough, sputum production and shortness of breath. Negative for hemoptysis and wheezing.    Cardiovascular: Positive for chest pain (Rib pain). Negative for palpitations, orthopnea, claudication, leg swelling and PND.   Gastrointestinal: Negative for abdominal pain, blood in stool, constipation, diarrhea, heartburn, melena, nausea and vomiting.   Genitourinary:        Urinary retention requiring ISC   Musculoskeletal: Positive for falls. Negative for back pain, joint pain, myalgias and neck pain.   Skin: Negative for itching and rash.   Neurological: Positive for weakness. Negative for dizziness and headaches.   Psychiatric/Behavioral: Negative for depression and suicidal ideas.      Physical Exam  Laboratory/Imaging   Hemodynamics  Temp (24hrs), Av.1 °C (98.7 °F), Min:36.7 °C (98.1 °F), Max:37.4 °C (99.4 °F)   Temperature: 36.8 °C (98.2 °F)  Pulse  Av.9  Min: 71  Max: 88    Blood Pressure : 106/53      Respiratory      Respiration: 18, Pulse Oximetry: 93 %, O2 Daily Delivery Respiratory : Silicone Nasal Cannula     Given By:: Mouthpiece, PEP/CPT Method:  Positive Airway Pressure Device, Work Of Breathing / Effort: Mild  RUL Breath Sounds: Clear, RML Breath Sounds: Diminished, RLL Breath Sounds: Diminished, OBED Breath Sounds: Clear, LLL Breath Sounds: Diminished    Fluids    Intake/Output Summary (Last 24 hours) at 01/27/18 1917  Last data filed at 01/27/18 1700   Gross per 24 hour   Intake                0 ml   Output              980 ml   Net             -980 ml       Nutrition  Orders Placed This Encounter   Procedures   • Diet Order     Standing Status:   Standing     Number of Occurrences:   1     Order Specific Question:   Diet:     Answer:   Regular [1]     Physical Exam   Constitutional: He is oriented to person, place, and time. He appears well-developed and well-nourished. No distress.   HENT:   Head: Normocephalic.   Mouth/Throat: Oropharynx is clear and moist. No oropharyngeal exudate.   Eyes: Conjunctivae are normal. Pupils are equal, round, and reactive to light. No scleral icterus.   Neck: Normal range of motion. No JVD present.   Cardiovascular: Normal rate and regular rhythm.    Murmur heard.  Pulmonary/Chest: No stridor. No respiratory distress. He has no wheezes. He has rales.   Abdominal: Soft. Bowel sounds are normal. He exhibits no distension. There is no tenderness. There is no rebound and no guarding.   Musculoskeletal: He exhibits no edema, tenderness or deformity.   Neurological: He is alert and oriented to person, place, and time. He has normal reflexes. No cranial nerve deficit.   Skin: Skin is warm and dry. He is not diaphoretic.   Psychiatric: He has a normal mood and affect. His behavior is normal. Judgment and thought content normal.       Recent Labs      01/25/18   1344  01/26/18   0226  01/26/18   2342   WBC  16.6*  13.5*  14.1*   RBC  4.47*  4.16*  4.52*   HEMOGLOBIN  13.1*  12.5*  13.8*   HEMATOCRIT  40.8*  37.3*  40.8*   MCV  91.3  89.7  90.3   MCH  29.3  30.0  30.5   MCHC  32.1*  33.5*  33.8   RDW  49.5  47.9  48.3   PLATELETCT   256  225  296   MPV  9.9  10.5  10.2     Recent Labs      01/25/18   1344  01/26/18   0226  01/26/18   2343   SODIUM  136  136  137   POTASSIUM  4.0  3.7  3.9   CHLORIDE  105  105  106   CO2  22  20  22   GLUCOSE  106*  96  103*   BUN  31*  29*  26*   CREATININE  1.56*  1.19  1.15   CALCIUM  9.2  8.3*  8.0*         Recent Labs      01/27/18   1032   BNPBTYPENAT  350*     Recent Labs      01/26/18   0226   TRIGLYCERIDE  57   HDL  42   LDL  48          Assessment/Plan     Acute hypoxemic respiratory failure (CMS-HCC)- (present on admission)   Assessment & Plan    PNA related  Continue management of infectious concerns  Elevated BNP / Pleural effusions noted  Diuresis if renal function remains stable post contrast administration  Obtain DVT duplex, at this time limited evidence to suggest VTE disease  Aggressive RT care with BD regimen / IS / Mucolytic agents  EF lower limit of normal, consider ACE-I / BB if BP allowing over the coming days          Pericarditis- (present on admission)   Assessment & Plan    Per Dr Crow with underlying pericardial effusion and chest pain  Recommend colchicine  Monitor for symptomatic improvement        Elevated troponin- (present on admission)   Assessment & Plan    Cardiology consultation requested  Patient to be evaluated by Dr Crow  Pericardial effusion seen  Discussed case with Dr Crow  Concern for pericarditis  Start colchicine  Repeat troponin in am tomorrow  Obtain DVT duplex        Pericardial effusion- (present on admission)   Assessment & Plan    Noted on echocardiogram and CT  Discussed with Tristin from cardiology   Concern for pericarditis  Start colchicine  Further recommendations per cardiology team        Adenopathy- (present on admission)   Assessment & Plan    Recommend further evaluation with CT neck and abd / pelvis  SPEP / UPEP pending  Hematology evaluation to be considered based on imaging results        Pleural effusion- (present on admission)   Assessment &  Plan    B/L   Likely para pneumonic  Recommend initiation of diuresis if renal function remains stable post contrast administration  Interval imaging over the coming days        Chest pain- (present on admission)   Assessment & Plan    Pleuritic in etiology  Related to pneumonia and pericarditis  Obtain DVT evaluation  Colchicine initiated  Continue management of pneumonia / BD regimen / RT care  Cardiology evaluation obtained        UTI (urinary tract infection)- (present on admission)   Assessment & Plan    This is 2/2 chronic urinary retention and related to ISC at home  Recent culture with E.Coli  Complicated UTI  Continue ceftriaxone  For now place diaz catheter  Plan at least 7 days of therapy  Outpatient urology evaluation recommended        Pneumonia- (present on admission)   Assessment & Plan    This is b/l LL pneumonia  Continue ceftriaxone and azithromycin at this time  Aim seven days of treatment  De escalate to PO over the coming days        Syncope- (present on admission)   Assessment & Plan    Continue telemetry monitoring  Evaluation of cardiac concerns per cardiology team  PT evaluation        Thoracic aortic aneurysm without rupture (CMS-HCC)- (present on admission)   Assessment & Plan    Outpatient surveillance per PCP recommended and VS evaluation recommended        Pulmonary nodules- (present on admission)   Assessment & Plan    Outpatient surveillance per PCP recommended        DNR (do not resuscitate)- (present on admission)   Assessment & Plan    Discussed with patient 01/26/18  Patient wants to be DNR / DNI  Patient verbalized understanding of his code status  Updated in EMR to reflect patient wishes        Leukocytosis- (present on admission)   Assessment & Plan    Without sepsis  Related to infectious etiologies  Continue to montior  Continue abx therapy at this time        Lytic lesion of bone on x-ray- (present on admission)   Assessment & Plan    Noted on CXRAY  CT chest negative for  these  Pending SPEP / UPEP  CT neck and abd / pelvis for adenopathy evaluation        BPH (benign prostatic hyperplasia)- (present on admission)   Assessment & Plan    With urinary retention requiring ISC  Placed diaz catheter for now  Hx TURP  Increased Flomax to 0.8 mg  Added finasteride  Outpatient urology follow up recommended        Fall- (present on admission)   Assessment & Plan    No injury noted on Imaging evaluation  PT/OT evaluation  Post acute placement as clinically appropriate        LBBB (left bundle branch block)- (present on admission)   Assessment & Plan    Chronic        CKD (chronic kidney disease) stage 3, GFR 30-59 ml/min- (present on admission)   Assessment & Plan    Monitor renal function / Avoid nephrotoxins and dose medications renally            Reviewed items::  Labs reviewed, Medications reviewed and Radiology images reviewed  Diaz catheter::  Urinary Tract Retention or Urinary Tract Obstruction  DVT prophylaxis pharmacological::  Heparin  DVT prophylaxis - mechanical:  SCDs  Ulcer Prophylaxis::  Not indicated  Antibiotics:  Treating active infection/contamination beyond 24 hours perioperative coverage

## 2018-01-28 NOTE — PROGRESS NOTES
Unable to insert a diaz or straight catheter into patent to drain his bladder. Called Dr. Perez and asked for a urology consult to insert a diaz with scope so as not to cause trauma. He said he would have day shift consult first thing in the morning. Dr Perez aware of current bladder scan of 450.

## 2018-01-28 NOTE — PROGRESS NOTES
CT tech called and stated CTA would not be able to be done until 2000 tonight due to the patient receiving too much contrast within the last 24 hours.    Dr. Guzman was notified and he stated that was fine.    CT tech was called back and notified.

## 2018-01-28 NOTE — PROGRESS NOTES
[facility] Hospitalist Progress Note    Date of Service: 1/28/2018    Chief Complaint  89 y.o. male admitted 1/25/2018 with Fall, Hip pain, cough. He is found to have pneumonia, UTI, acute PE, pericarditis with pericardial effusion, DVT and metastatic malignancy of unknown primary at this time.     Interval Problem Update  Patient seen and evaluated on rounds  Discussed work up with patient and daughter  Patient wants to purse further evaluation and management  Code status remains DNR / DNI  DVT noted  Appreciate cardiology input  Ongoing pleuritic chest pain  Ongoing cough, SOB and sputum production  PNA evident on imaging, patient on abx  Acute PE is present given clinical signs  Continue colchicine  Aggressive BD regimen / RT care at this time  Case discussed with Dr Betancourt  Case discussed with Dr Hudson    Patient is agreeable to palliative team consultation for advance care planning    Consultants/Specialty  Cardiology  Radiation oncology    Disposition  Continue telemetry monitoring for now  Consider post acute placement as clinically appropriate        Review of Systems   Constitutional: Positive for malaise/fatigue. Negative for chills, diaphoresis and fever.   HENT: Positive for hearing loss. Negative for tinnitus.    Eyes: Negative for blurred vision and double vision.   Respiratory: Positive for cough, sputum production and shortness of breath. Negative for hemoptysis and wheezing.    Cardiovascular: Positive for chest pain (Rib pain). Negative for palpitations, orthopnea, claudication, leg swelling and PND.   Gastrointestinal: Negative for abdominal pain, blood in stool, constipation, diarrhea, heartburn, melena, nausea and vomiting.   Genitourinary:        Urinary retention requiring ISC   Musculoskeletal: Positive for falls. Negative for back pain, joint pain, myalgias and neck pain.   Skin: Negative for itching and rash.   Neurological: Positive for weakness. Negative for dizziness and headaches.    Psychiatric/Behavioral: Negative for depression and suicidal ideas.      Physical Exam  Laboratory/Imaging   Hemodynamics  Temp (24hrs), Av.7 °C (98.1 °F), Min:36.1 °C (96.9 °F), Max:37.2 °C (98.9 °F)   Temperature: 37.2 °C (98.9 °F)  Pulse  Av.6  Min: 55  Max: 91    Blood Pressure : 137/65      Respiratory      Respiration: 18, Pulse Oximetry: 92 %, O2 Daily Delivery Respiratory : Silicone Nasal Cannula     Given By:: Mouthpiece, PEP/CPT Method: Positive Airway Pressure Device, Work Of Breathing / Effort: Mild  RUL Breath Sounds: Clear, RML Breath Sounds: Diminished, RLL Breath Sounds: Diminished, OBED Breath Sounds: Clear, LLL Breath Sounds: Diminished    Fluids    Intake/Output Summary (Last 24 hours) at 18 1540  Last data filed at 18 1700   Gross per 24 hour   Intake                0 ml   Output              450 ml   Net             -450 ml       Nutrition  Orders Placed This Encounter   Procedures   • Diet Order     Standing Status:   Standing     Number of Occurrences:   1     Order Specific Question:   Diet:     Answer:   Regular [1]   • DIET NPO     Standing Status:   Standing     Number of Occurrences:   8     Order Specific Question:   Restrict to:     Answer:   Sips with Medications [3]     Physical Exam   Constitutional: He is oriented to person, place, and time. He appears well-developed and well-nourished. No distress.   HENT:   Head: Normocephalic.   Mouth/Throat: Oropharynx is clear and moist. No oropharyngeal exudate.   Eyes: Conjunctivae are normal. Pupils are equal, round, and reactive to light. No scleral icterus.   Neck: Normal range of motion. JVD present.   Cardiovascular: Normal rate and regular rhythm.    Murmur heard.  Pulmonary/Chest: No stridor. No respiratory distress. He has no wheezes. He has rales.   Abdominal: Soft. Bowel sounds are normal. He exhibits no distension. There is no tenderness. There is no rebound and no guarding.   Musculoskeletal: He exhibits no  edema, tenderness or deformity.   Neurological: He is alert and oriented to person, place, and time. He has normal reflexes. No cranial nerve deficit.   Skin: Skin is warm and dry. He is not diaphoretic.   Psychiatric: He has a normal mood and affect. His behavior is normal. Judgment and thought content normal.       Recent Labs      01/26/18 0226 01/26/18 2342  01/28/18   0347   WBC  13.5*  14.1*  11.6*   RBC  4.16*  4.52*  4.44*   HEMOGLOBIN  12.5*  13.8*  12.9*   HEMATOCRIT  37.3*  40.8*  40.4*   MCV  89.7  90.3  91.0   MCH  30.0  30.5  29.1   MCHC  33.5*  33.8  31.9*   RDW  47.9  48.3  48.0   PLATELETCT  225  296  298   MPV  10.5  10.2  10.4     Recent Labs      01/26/18 0226 01/26/18   2343  01/28/18   0347   SODIUM  136  137  136   POTASSIUM  3.7  3.9  3.7   CHLORIDE  105  106  105   CO2  20  22  22   GLUCOSE  96  103*  97   BUN  29*  26*  19   CREATININE  1.19  1.15  1.07   CALCIUM  8.3*  8.0*  8.3*         Recent Labs      01/27/18   1032   BNPBTYPENAT  350*     Recent Labs      01/26/18   0226   TRIGLYCERIDE  57   HDL  42   LDL  48          Assessment/Plan     Acute hypoxemic respiratory failure (CMS-HCC)- (present on admission)   Assessment & Plan    PNA / Acute PE related  Continue management of infectious concerns  Elevated BNP / Pleural effusions noted  Diuresis if renal function remains stable over the coming days as PE treatment initiated now  Aggressive RT care with BD regimen / IS / Mucolytic agents  EF lower limit of normal, consider ACE-I / BB if BP allowing over the coming days          DVT (deep venous thrombosis) (CMS-HCC)- (present on admission)   Assessment & Plan    Acute   B/L LE  Malignancy related  Plan of care as noted in PE        Pulmonary embolism (CMS-HCC)- (present on admission)   Assessment & Plan    Acute   B/L PE, given b/l pleuritic pain  Limited utility to pursing diagnostic evaluation with CT PE  Diagnostic evaluation will not change underlying management at this  time  Malignancy related  At this time initiate IV heparin, monitor APTT, CBC  Consider transitioning to SC lovenox over the coming days          Pericarditis- (present on admission)   Assessment & Plan    Per Dr Crow with underlying pericardial effusion and chest pain  Recommend colchicine  Monitor for symptomatic improvement  Underlying malignant etiologies cannot be ruled out at this time        Metastatic neoplastic disease (CMS-HCC)- (present on admission)   Assessment & Plan    Metastatic disease of unknown primary site at this time  There is Right hepatic metastasis  Borderline gastrohepatic ligament lymph node could be is likely malignant  L3 osteolytic metastasis.  T2 osteolytic metastasis causes mild central canal encroachment but no pathologic fracture is seen  Level 3 cervical lymphadenopathy bilaterally.   Multiple bilateral pulmonary nodules  Matted mediastinal and neck enlarged lymph nodes  Mild to Moderate pericardial effusion    Course c/b VTE disease    Recommendations:   - Patient at this time wants to pursue further work up  - Obtain MRI cervical spine, thoracic spine, lumbar spine  - Obtain Whole body bone scan  - No acute needs for neurosurgical consultation at this time  - I discussed the case with Dr Rodriguez from radiation oncology  - Await radiation oncology input  - I discussed the case with Dr Carlisle from IR. Plan to biopsy one of the spine mets  - Order for IR biopsy placed, NPO after midnight  - Once biopsy results available, oncology consultation for input  - SPEP / UPEP pending        Pleural effusion- (present on admission)   Assessment & Plan    B/L   Likely para pneumonic vs malignant  Recommend initiation of diuresis over the coming days  Interval imaging over the coming days  Small, high risk to tap at this time  If persistent / worsening will consider diagnostic evaluation        Chest pain- (present on admission)   Assessment & Plan    Pleuritic in etiology  Related to pneumonia,  PE and pericarditis  Continue Colchicine  Continue management of pneumonia / BD regimen / RT care  Continue anticoagulation        UTI (urinary tract infection)- (present on admission)   Assessment & Plan    This is 2/2 chronic urinary retention and related to ISC at home  Recent culture with E.Coli  Complicated UTI  Continue ceftriaxone  For now place diaz catheter  Plan at least 7 days of therapy  Outpatient urology evaluation recommended        Pneumonia- (present on admission)   Assessment & Plan    This is b/l LL pneumonia  Continue ceftriaxone and azithromycin at this time  Aim seven days of treatment  De escalate to PO over the coming days        Elevated troponin- (present on admission)   Assessment & Plan    PE / Pericarditis related  Evaluated by cardiology this hospital stay        Pericardial effusion- (present on admission)   Assessment & Plan    Noted on echocardiogram and CT  Discussed with cardiology  Suspected pericarditis  Continue colchicine  Malignant etiologies cannot be ruled out  Appreciated cardiology input  Interval TTE in 2-4 weeks recommended        Thoracic aortic aneurysm without rupture (CMS-HCC)- (present on admission)   Assessment & Plan    Outpatient surveillance per PCP recommended and VS evaluation recommended        Pulmonary nodules- (present on admission)   Assessment & Plan    These may be malignant  Outpatient surveillance per PCP/Oncology team recommended        DNR (do not resuscitate)- (present on admission)   Assessment & Plan    Discussed with patient 01/26/18  Patient wants to be DNR / DNI  Patient verbalized understanding of his code status  Updated in EMR to reflect patient wishes        Leukocytosis- (present on admission)   Assessment & Plan    Without sepsis  Related to infectious etiologies  Continue to montior  Continue abx therapy at this time  Slowly improving        Lytic lesion of bone on x-ray- (present on admission)   Assessment & Plan    Noted on CXRAY  CT  chest negative for these  Pending SPEP / UPEP  Underlying metastatic malignancy is seen  Whole body bone scan requested by me        BPH (benign prostatic hyperplasia)- (present on admission)   Assessment & Plan    With urinary retention requiring ISC  Recommend placement for diaz catheter  If issues with placement then urology will be consulted  Hx TURP  Increased Flomax to 0.8 mg  Added finasteride  Outpatient urology follow up recommended        Fall- (present on admission)   Assessment & Plan    No injury noted on Imaging evaluation  PT/OT evaluation  Post acute placement as clinically appropriate        Syncope- (present on admission)   Assessment & Plan    Related to acute PE  Post acute placement as clinically appropriate        LBBB (left bundle branch block)- (present on admission)   Assessment & Plan    Chronic        CKD (chronic kidney disease) stage 3, GFR 30-59 ml/min- (present on admission)   Assessment & Plan    Monitor renal function / Avoid nephrotoxins and dose medications renally            I personally spend 68 minutes FTF with the patient and his daughter today discussing the underlying work up obtained concerning for metastatic malignancy. We discussed his wishes which are for further evaluation and management at this time. We discussed further evaluation plans / plan of care in detail. All questions / concerns answered. Billing [id number].      Reviewed items::  Labs reviewed, Medications reviewed and Radiology images reviewed  Diaz catheter::  Urinary Tract Retention or Urinary Tract Obstruction  DVT prophylaxis pharmacological::  Heparin  Ulcer Prophylaxis::  Not indicated  Antibiotics:  Treating active infection/contamination beyond 24 hours perioperative coverage

## 2018-01-29 PROBLEM — G95.20 CORD COMPRESSION (HCC): Status: ACTIVE | Noted: 2018-01-01

## 2018-01-29 NOTE — CARE PLAN
Problem: Safety  Goal: Will remain free from injury  Outcome: PROGRESSING AS EXPECTED  Bed alarm in use. PT encouraged to call for help. Call bell in reach    Problem: Skin Integrity  Goal: Risk for impaired skin integrity will decrease  Outcome: PROGRESSING AS EXPECTED  PT turns with encouragement.

## 2018-01-29 NOTE — CARE PLAN
Problem: Nutritional:  Goal: Achieve adequate nutritional intake  Patient will consume 50% of meals   Outcome: PROGRESSING SLOWER THAN EXPECTED  Inadequate documentation of meal intake  document intake of all meals and supplements as % taken in ADL's to provide interdisciplinary communication across all shifts   Encourage intake of meals/supplements

## 2018-01-29 NOTE — PROGRESS NOTES
Pt unable to complete c/t/l spine MRI with and without contrast due to pain.  Obtained lumbar spine without contrast and one thoracic spine series.  Pt went to IR after MRI as planned. RN informed.

## 2018-01-29 NOTE — OR SURGEON
Immediate Post- Operative Note        PostOp Diagnosis: BONE METS      Procedure(s): FLUORO-GUIDED L3 BONE BIOPSY      Estimated Blood Loss: Less than 5 ml        Complications: None            1/29/2018     11:41 AM     Melchor Medina

## 2018-01-29 NOTE — PROGRESS NOTES
Pt returned to room. Monitor placed and monitor room notified. Heparin gtt restarted; bed alarm on. Pt confused; re-oriented but forgetful. Lumbar sights are covered in gauze; CDI.

## 2018-01-29 NOTE — CONSULTS
RADIATION ONCOLOGY CONSULT    DATE OF SERVICE: 1/29/2018    IDENTIFICATION: A 89 y.o. male with widely metastatic disease primary unknown.  He is here at the kind request of the hospitalist for palliative radiotherapy  to thoracic spine.    HISTORY OF PRESENT ILLNESS: Patient is unable to give history because of sedation from bone biopsy that was done immediately before this consult. I coordinated a transfer patient from radiology to radiation oncology for this consult. Most of the history is gleaned from the chart and speaking to his daughter. Daughter states that father has been declining over the past 2 months with anorexia, weight loss, failure to thrive, and more recently multiple falls. He was initially seen in January were UTI was diagnosed. He was treated with antibiotics. Follow-up was scheduled last week. On evaluation he was noted to have significant decline in performance status and transferred to the hospital. Admitted noted to have pneumonia. Imaging workup demonstrated widely metastatic disease with multiple pulmonary nodules, cervical mediastinal and retroperitoneal adenopathy. Multiple areas of bone metastasis involving the spine with fairly significant destruction at T4 through 8 with potential compromise of the spinal cord. Also an area T2 is identified with disease  encroaching on the cord. Notes indicate that he is neurologically stable.    He's undergone bone biopsy at L3 and will undergo simulation for palliative radiotherapy to lower thoracic as well as upper thoracic spine. Permission is been given by daughter for the simulation process.     PAST MEDICAL HISTORY:   Past Medical History:   Diagnosis Date   • BPH (benign prostatic hyperplasia)    • Dental disorder     upper   • Hard of hearing    • Memory loss        PAST SURGICAL HISTORY:  Past Surgical History:   Procedure Laterality Date   • CYSTOSCOPY  1/14/2013    Performed by Bradley Sales M.D. at SURGERY [facility]   • TRANS  URETHRAL RESECTION PROSTATE  1/14/2013    Performed by Bradley Sales M.D. at SURGERY [facility]   • OTHER ABDOMINAL SURGERY  1940`s    hernia repair, appy   • OTHER ORTHOPEDIC SURGERY      right  rotator cuff repair       CURRENT MEDICATIONS:  Current Facility-Administered Medications   Medication Dose Route Frequency Provider Last Rate Last Dose   • ipratropium-albuterol (DUONEB) nebulizer solution 3 mL  3 mL Nebulization Q4H PRN (RT) Markus Boyer M.D.       • NS (BOLUS) infusion 500 mL  500 mL Intravenous Once PRN Melchor Medina M.D.       • fentaNYL (SUBLIMAZE) injection 12.5-50 mcg  12.5-50 mcg Intravenous PRN Melchor Medina M.D.   25 mcg at 01/29/18 1117   • midazolam (VERSED) injection 0.5-2 mg  0.5-2 mg Intravenous PRN Melchor Medina M.D.   2 mg at 01/29/18 1110   • ondansetron (ZOFRAN) syringe/vial injection 4 mg  4 mg Intravenous PRN Melchor Medina M.D.       • dexamethasone (DECADRON) injection 6 mg  6 mg Intravenous Q6HRS Hedy ROSS M.D.   6 mg at 01/29/18 1242   • morphine (pf) 4 mg/ml injection 2-4 mg  2-4 mg Intravenous Q4HRS PRN Desire Guzman M.D.       • heparin injection 3,200 Units  3,200 Units Intravenous PRN Desire Guzman M.D.   3,200 Units at 01/29/18 0427    And   • heparin infusion 25,000 units in 500 ml 0.45% nacl   Intravenous Continuous Desire Guzman M.D. 27 mL/hr at 01/29/18 1246 1,350 Units/hr at 01/29/18 1246   • colchicine (COLCRYS) tablet 0.6 mg  0.6 mg Oral BID Desire Guzman M.D.   0.6 mg at 01/29/18 0938   • tamsulosin (FLOMAX) capsule 0.8 mg  0.8 mg Oral QHS Desire Guzman M.D.   0.8 mg at 01/28/18 2201   • finasteride (PROSCAR) tablet 5 mg  5 mg Oral QHS Desire Guzman M.D.   5 mg at 01/28/18 2201   • Pharmacy Consult Request 1 Each  1 Each Other PRN Markus Boyer M.D.       • Respiratory Care per Protocol   Nebulization Continuous RT Markus Boyer M.D.       • acetaminophen (TYLENOL) tablet 650 mg  650 mg Oral Q6HRS PRN Markus Boyer M.D.    "650 mg at 01/27/18 1722   • cefTRIAXone (ROCEPHIN) syringe 2 g  2 g Intravenous Q24HRS Markus Boyer M.D.   2 g at 01/29/18 0939   • ondansetron (ZOFRAN) syringe/vial injection 4 mg  4 mg Intravenous Q4HRS PRN Markus Boyer M.D.       • ondansetron (ZOFRAN ODT) dispertab 4 mg  4 mg Oral Q4HRS PRN Markus Boyer M.D.           ALLERGIES:    Pcn [penicillins] and Sulfa drugs    FAMILY HISTORY:    family history is not on file.    SOCIAL HISTORY:     reports that he has quit smoking. His smoking use included Cigarettes. He has a 12.50 pack-year smoking history. He has never used smokeless tobacco. He reports that he drinks alcohol. He reports that he does not use drugs.    REVIEW OF SYSTEMS: Unable to obtain because of sedation.     PHYSICAL EXAM:    ECOG PERFORMANCE STATUS:  3 = Capable of only limited self care, confined to bed or chair more than 50% of waking hours  /81   Pulse 88   Temp 37.2 °C (98.9 °F)   Resp 18   Ht 1.981 m (6' 6\")   Wt 83.7 kg (184 lb 8.4 oz)   SpO2 92%   BMI 21.32 kg/m²   GENERAL: Sedated male occasionally winces in pain especially when moving him from hospital bed to CT table.  HEENT:  Pupils are equal, round, and reactive to light.    LUNGS:  Clear to ascultation and resonant to percussion.  HEART:  No murmur appreciated    LABORATORY DATA:   Lab Results   Component Value Date/Time    SODIUM 137 01/29/2018 02:18 AM    POTASSIUM 3.9 01/29/2018 02:18 AM    CHLORIDE 105 01/29/2018 02:18 AM    CO2 25 01/29/2018 02:18 AM    GLUCOSE 106 (H) 01/29/2018 02:18 AM    BUN 18 01/29/2018 02:18 AM    CREATININE 0.99 01/29/2018 02:18 AM    CREATININE 1.2 09/09/2008 09:51 AM     Lab Results   Component Value Date/Time    ALKPHOSPHAT 99 01/29/2018 02:18 AM    ASTSGOT 28 01/29/2018 02:18 AM    ALTSGPT 20 01/29/2018 02:18 AM    TBILIRUBIN 0.6 01/29/2018 02:18 AM      Lab Results   Component Value Date/Time    WBC 12.1 (H) 01/29/2018 02:18 AM    RBC 4.48 (L) 01/29/2018 02:18 AM    " HEMOGLOBIN 13.4 (L) 01/29/2018 02:18 AM    HEMATOCRIT 40.3 (L) 01/29/2018 02:18 AM    MCV 90.0 01/29/2018 02:18 AM    MCH 29.9 01/29/2018 02:18 AM    MCHC 33.3 (L) 01/29/2018 02:18 AM    MPV 10.2 01/29/2018 02:18 AM    NEUTSPOLYS 75.00 (H) 01/29/2018 02:18 AM    LYMPHOCYTES 10.00 (L) 01/29/2018 02:18 AM    MONOCYTES 11.20 01/29/2018 02:18 AM    EOSINOPHILS 2.50 01/29/2018 02:18 AM    BASOPHILS 0.60 01/29/2018 02:18 AM        RADIOLOGY DATA:  Ct-abdomen-pelvis With Result Date: 1/27/2018  Right hepatic metastasis Borderline gastrohepatic ligament lymph node could be reactive or malignant 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 Additional lucencies are suspicious as well. Recommend bone scan to further analyze (if PET/CT is not performed)    Ct-chest (thorax) W/o Addendum Date: 1/29/2018  ADDENDUM: Upon reviewing the bone windows of the CT scan of the thorax dated 1/26/2018. There are multiple varying sized lytic destructive lesions involving the T2, T5, T7, T8, T9 and, and T10 vertebral bodies. Further there is distraction of the posterior walls of all of these vertebral bodies except for T10 which is worrisome for cord compression especially at the T7-T9 levels. There is also mild pathologic compression fracture at the T5 level. 1.  Diffuse metastatic metastatic disease throughout the thoracic spine with a pathologic compression fracture at the T5 level. 2. Metastatic disease extending through the posterior walls of the T7-T9 vertebral bodies suspicious for cord compression at these levels. 1.  An Emergent Document Only message has been documented for ANDREA CHUN in the [employer]  Critical Result system on 1/29/2018 12:24 PM, Message ID [id number].    1.  Bilateral dependent pulmonary consolidations with small bilateral pleural effusions, right greater than left. 2.  Multiple bilateral pulmonary nodules, radiographic follow-up in 3 months with repeat CT chest as clinically appropriate. Correlate with history. 3.  4.9  cm ascending thoracic aortic aneurysm, radiographic follow-up and surveillance as clinically appropriate. 4.  Matted mediastinal and neck enlarged lymph nodes, workup and evaluation for causes of adenopathy as clinically appropriate 5.  Cardiomegaly. 6.  Moderate pericardial effusion 7.  Atherosclerosis and atherosclerotic coronary artery disease     Ct-head W/o Result Date: 1/25/2018  1.  No acute abnormality. 2.  Mild cerebral atrophy. 3.  Mild chronic microvascular ischemic disease.    Ct-soft Tissue Neck With Result Date: 1/27/2018  1.  T2 osteolytic metastasis causes mild central canal encroachment but no pathologic fracture is seen 2.  Level 3 cervical lymphadenopathy bilaterally. No enlarged nodes above this 3.  Mild atherosclerosis with no significant stenosis seen    Mr-lumbar Spine-w/o Addendum Date: 1/29/2018  Addendum: Incomplete thoracic MRI study. Single sagittal T2-weighted sequences were reviewed. It demonstrates multiple metastatic bone lesions in the midthoracic spine. There is abnormal soft tissue along the dorsal spinal cord from the levels of T4-T8 causing severe canal compromise.    1.  Multiple T2 hyperintense lesions in the L3, L4 and T12 vertebral bodies likely representing metastasis. There is no evidence of mass in the lumbar spinal canal causing canal compromise. 2.  Degenerative disease as described above. 3.  Multiple metastatic lesions in the thoracic spine with possible canal compromise. Please see MR thoracic spine report for further details. 4.  Focal an approximately 2.1 cm sized lesion in the caudate lobe of the liver likely representing metastasis.    Ir-needle Bx-deep Bone(ct) Result Date: 1/29/2018  1.  Successful bilateral transpedicular core bone biopsies of the L3 vertebral body.      IMPRESSION:    A 89 y.o. with newly diagnosed widely metastatic disease primary unknown pending on biopsy results. Cord compromise lower thoracic spine approximately T4-T8 and  T2.    RECOMMENDATIONS:   Discussed with daughter palliative radiotherapy to the spine which will involve delivering 3000 cGy in 10 fractions over 2 weeks to the T4-T8 lesion and 2000 cGy in 5 fractions to the T2 lesion which can be done concurrently. In addition will start patient on dexamethasone 6 mg every 12 hours. Considering the urgency will begin the treatments today. Daughter agrees with plan.    Thank you for the opportunity to participate in his care.  If any questions or comments, please do not hesitate in calling.    Hedy ROSS M.D.  Electronically signed by: Hedy Rodriguez V, 1/29/2018 12:56 PM  [phone]

## 2018-01-29 NOTE — NON-PROVIDER
1155  Pt arrived via strectcher from IVR accompanied by Eran IR nurse. Pt is s/p bone bx to T3. Pt is lethargic but awakens to name called. Pt denies pain at this time. Dressing to T3 area with c/d gauze/tegaderm noted.  VS upon arrival to radiation oncology BP 94/53, HR 87 O2 sats 90% on 4L per NC, RR 20. Pt has 20ga IV to left upper FA with NS infusing at approx 100ml per hour (per IR nurse pt is to finish this 500ml bag of saline then HP). KB  1205 pt transferred from stretcher to CT sim table. VS prior to sim /69 hr 88 O2 sats 4L per NC.  1220 pt finished CT sim - pt tolerated scan without c/o. /61 hr 89 O2 sats 90% on 4L per NC.  1225 pt transferred to  807-1. Report given to Gracie - . Dressing inspected in room is c/d to T3 biopsy site. KB

## 2018-01-29 NOTE — PROGRESS NOTES
MRI revealing Multiple metastatic lesions in the thoracic spine with possible canal compromise. Please see MR thoracic spine report for further details.    I do not see results of MRI thoracic spine    I discussed findings with Dr Gonzales from neurosurgery    He reviewed the images. No concerns from neurosurgery perspective    F/U thoracic spine MRI. If any changes on it or clinical changes to consult Dr Carlson again    I have requested oncology consultation. Dr Batista to evaluate the patient    Desire Guzman M.D.  01/29/18  12:11 PM

## 2018-01-29 NOTE — PROGRESS NOTES
IR RN note:    Site Confirmed with MD, patient and RN pre procedure   Deep bone BX by MD Medina assisted by RT Aaliyah, L3 bilateral posterior access sites;  L3 X 4 cores in fomrlain sent to lab, double confirmed with RT   End Tidal CO2 range 33-39 during procedure   Patient tolerated procedure, hemodynamically stable; pt lethargic and talking post procedure; report given to TEE Bowman and Radiation oncology TEE Serna ;   patient transported radiation oncology via RN and transport then transferred care to TEE Bowman to bring tele box for patient in Radiation oncology

## 2018-01-29 NOTE — DISCHARGE PLANNING
Transitional Care Navigator:    Met with the patient and his niece at the bedside regarding discharge planning needs.  Pt lives alone and is generally very active. Therapies have indicated possible SNF care post acutely, however, the patient's daughter has indicated that she would be able to have the patient come to her home post acutely with home health follow up.   Plan f/u with MD with this update.

## 2018-01-29 NOTE — CARE PLAN
Problem: Safety  Goal: Will remain free from injury  Outcome: PROGRESSING AS EXPECTED  Safety and fall precautions in place, bed in lowest position, treaded socks on, upper bedrails up.  Pt educated on call light and verbalizes understanding.      Problem: Pain Management  Goal: Pain level will decrease to patient's comfort goal  Outcome: PROGRESSING AS EXPECTED  Discussed POC for pain mgmt.  Medicated per MAR &/or practicing non-pharmacological interventions.

## 2018-01-30 NOTE — CARE PLAN
"Problem: Safety  Goal: Will remain free from falls  Outcome: PROGRESSING AS EXPECTED  Fall precautions in place. See \"all risk\" flowsheet for details.      "

## 2018-01-30 NOTE — CARE PLAN
Problem: Communication  Goal: The ability to communicate needs accurately and effectively will improve  Outcome: PROGRESSING SLOWER THAN EXPECTED  Pt is AOx 2-3; pt listens to education regarding care plan and hospital processes but is forgetful. Pt does not use call light, bed alarm on and frequent rounding implemented. Will continue to monitor and educate.

## 2018-01-30 NOTE — PROGRESS NOTES
[facility] Hospitalist Progress Note    Date of Service: 1/29/2018    Chief Complaint  89 y.o. male admitted 1/25/2018 with Fall, Hip pain, cough. He is found to have pneumonia, UTI, acute PE, pericarditis with pericardial effusion, DVT and metastatic malignancy of unknown primary at this time.     Interval Problem Update  Patient seen and evaluated on rounds  Discussed work up with patient  Emergent MRI obtained as not obtained yesterday  L3 biopsy done with IR, co ordinated today  Cord compromise  Discussed with NS  Dexamethasone started  Radiation oncology on board  Oncology consultation requested  Patient wants to purse further evaluation and management  Code status remains DNR / DNI  DVT noted  Consistent with PE in addition  Ongoing pleuritic chest pain  Ongoing cough, SOB and sputum production  PNA evident on imaging, patient on abx  Acute PE is present given clinical signs  Continue colchicine  Aggressive BD regimen / RT care at this time    Patient is agreeable to palliative team consultation for advance care planning  Recommend consideration towards hospice care  Await palliative team input at this time    Consultants/Specialty  Cardiology - [facility]  Radiation oncology - Dr Betancourt   Oncology - Dr Batista  Neurosurgery - Dr Carlson    Disposition  Continue telemetry monitoring for now   Can be transferred to oncology RNF with remote telemetry monitoring  Consider post acute placement as clinically appropriate        Review of Systems   Constitutional: Positive for malaise/fatigue. Negative for chills, diaphoresis and fever.   HENT: Positive for hearing loss. Negative for tinnitus.    Eyes: Negative for blurred vision and double vision.   Respiratory: Positive for cough, sputum production and shortness of breath. Negative for hemoptysis and wheezing.    Cardiovascular: Positive for chest pain (Rib pain). Negative for palpitations, orthopnea, claudication, leg swelling and PND.   Gastrointestinal:  Negative for abdominal pain, blood in stool, constipation, diarrhea, heartburn, melena, nausea and vomiting.   Genitourinary:        Urinary retention requiring ISC   Musculoskeletal: Positive for falls. Negative for back pain, joint pain, myalgias and neck pain.   Skin: Negative for itching and rash.   Neurological: Positive for weakness. Negative for dizziness and headaches.   Psychiatric/Behavioral: Negative for depression and suicidal ideas.      Physical Exam  Laboratory/Imaging   Hemodynamics  Temp (24hrs), Av.8 °C (98.2 °F), Min:36.2 °C (97.2 °F), Max:37.2 °C (98.9 °F)   Temperature: 36.6 °C (97.9 °F)  Pulse  Av.1  Min: 55  Max: 107 Heart Rate (Monitored): (!) 104  Blood Pressure : 110/65, NIBP: 110/72      Respiratory      Respiration: 16, Pulse Oximetry: 94 %, O2 Daily Delivery Respiratory : Nasal Cannula     Given By:: Mouthpiece, PEP/CPT Method: Positive Airway Pressure Device, Work Of Breathing / Effort: Mild  RUL Breath Sounds: Diminished, RML Breath Sounds: Diminished, RLL Breath Sounds: Diminished, OBED Breath Sounds: Diminished, LLL Breath Sounds: Diminished    Fluids    Intake/Output Summary (Last 24 hours) at 18 1737  Last data filed at 18 1600   Gross per 24 hour   Intake              240 ml   Output             1300 ml   Net            -1060 ml       Nutrition  Orders Placed This Encounter   Procedures   • DIET ORDER     Standing Status:   Standing     Number of Occurrences:   1     Order Specific Question:   Diet:     Answer:   Regular [1]     Physical Exam   Constitutional: He is oriented to person, place, and time. He appears well-developed and well-nourished. No distress.   HENT:   Head: Normocephalic.   Mouth/Throat: Oropharynx is clear and moist. No oropharyngeal exudate.   Eyes: Conjunctivae are normal. Pupils are equal, round, and reactive to light. No scleral icterus.   Neck: Normal range of motion. JVD present.   Cardiovascular: Normal rate and regular rhythm.     Murmur heard.  Pulmonary/Chest: No stridor. No respiratory distress. He has no wheezes. He has rales.   Abdominal: Soft. Bowel sounds are normal. He exhibits no distension. There is no tenderness. There is no rebound and no guarding.   Musculoskeletal: He exhibits no edema, tenderness or deformity.   Neurological: He is alert and oriented to person, place, and time. He has normal reflexes. No cranial nerve deficit.   Skin: Skin is warm and dry. He is not diaphoretic.   Psychiatric: He has a normal mood and affect. His behavior is normal. Judgment and thought content normal.       Recent Labs      01/26/18   2342  01/28/18   0347  01/29/18   0218   WBC  14.1*  11.6*  12.1*   RBC  4.52*  4.44*  4.48*   HEMOGLOBIN  13.8*  12.9*  13.4*   HEMATOCRIT  40.8*  40.4*  40.3*   MCV  90.3  91.0  90.0   MCH  30.5  29.1  29.9   MCHC  33.8  31.9*  33.3*   RDW  48.3  48.0  47.8   PLATELETCT  296  298  311   MPV  10.2  10.4  10.2     Recent Labs      01/26/18   2343  01/28/18   0347  01/29/18   0218   SODIUM  137  136  137   POTASSIUM  3.9  3.7  3.9   CHLORIDE  106  105  105   CO2  22  22  25   GLUCOSE  103*  97  106*   BUN  26*  19  18   CREATININE  1.15  1.07  0.99   CALCIUM  8.0*  8.3*  8.6     Recent Labs      01/28/18   2033  01/29/18   0218  01/29/18   1451   APTT  65.3*  52.8*  41.1*   INR   --   1.20*   --      Recent Labs      01/27/18   1032   BNPBTYPENAT  350*              Assessment/Plan     Acute hypoxemic respiratory failure (CMS-HCC)- (present on admission)   Assessment & Plan    PNA / Acute PE related  Continue management of infectious concerns  Elevated BNP / Pleural effusions noted  Diuresis if renal function remains stable over the coming days as PE treatment initiated now  Aggressive RT care with BD regimen / IS / Mucolytic agents  EF lower limit of normal, consider ACE-I / BB if BP allowing over the coming days          Cord compression (CMS-[facility])- (present on admission)   Assessment & Plan    Thoracic  spine  Radiation oncology on board  Radiation treatment started  Discussed with Dr Carlson  Do not recommend surgical intervention  F/U Dedicated MRI thoracic spine and again consult NS  Continue dexamethasone for now        DVT (deep venous thrombosis) (CMS-HCC)- (present on admission)   Assessment & Plan    Acute   B/L LE  Malignancy related  Plan of care as noted in PE        Pulmonary embolism (CMS-HCC)- (present on admission)   Assessment & Plan    Acute   B/L PE, given b/l pleuritic pain  Limited utility to pursing diagnostic evaluation with CT PE  Diagnostic evaluation will not change underlying management at this time  Malignancy related  At this time initiate IV heparin, monitor APTT, CBC  Consider transitioning to SC lovenox over the coming days if no procedures are planned          Metastatic neoplastic disease (CMS-HCC)- (present on admission)   Assessment & Plan    Metastatic disease of unknown primary site at this time  There is Right hepatic metastasis  Borderline gastrohepatic ligament lymph node could be is likely malignant  L3 osteolytic metastasis.  T2 osteolytic metastasis causes mild central canal encroachment but no pathologic fracture is seen  Level 3 cervical lymphadenopathy bilaterally.   Multiple bilateral pulmonary nodules  Matted mediastinal and neck enlarged lymph nodes  Mild to Moderate pericardial effusion  MRI spine concerning for spine compromise    Course c/b VTE disease    Recommendations:   - Patient at this time wants to pursue further work up and management  - Obtain MRI cervical spine, thoracic spine  - Obtain Whole body bone scan  - Case discussed with Dr Gonzales who do not recommend surgical intervention at this time  - F/U dedicated MRI thoracic spine and consider NS consultation again  - Radiation oncology on board  - Dr Batista from oncology consultated  - F/U biopsy results  - Check PSA  - SPEP / UPEP pending  - Dexamethasone for cord compression  - Recommend palliative  consultation and consideration towards hospice care        Pleural effusion- (present on admission)   Assessment & Plan    B/L   Likely para pneumonic vs malignant  Recommend consideration towards initiation of diuresis over the coming days  Interval imaging over the coming days  Small, high risk to tap at this time  If persistent / worsening will consider diagnostic evaluation        Pericarditis- (present on admission)   Assessment & Plan    Per Dr Crow with underlying pericardial effusion and chest pain  Recommend colchicine  Monitor for symptomatic improvement  Underlying malignant etiologies cannot be ruled out at this time        Elevated troponin- (present on admission)   Assessment & Plan    PE / Pericarditis related  Evaluated by cardiology this hospital stay        Pericardial effusion- (present on admission)   Assessment & Plan    Noted on echocardiogram and CT  Discussed with cardiology  Suspected pericarditis  Continue colchicine  Malignant etiologies cannot be ruled out  Appreciated cardiology input  Interval TTE in 2-4 weeks recommended        Thoracic aortic aneurysm without rupture (CMS-HCC)- (present on admission)   Assessment & Plan    Outpatient surveillance per PCP recommended and VS evaluation recommended        Pulmonary nodules- (present on admission)   Assessment & Plan    These may be malignant  Outpatient surveillance per PCP/Oncology team recommended        Chest pain- (present on admission)   Assessment & Plan    Pleuritic in etiology  Related to pneumonia, PE and pericarditis  Continue Colchicine  Continue management of pneumonia / BD regimen / RT care  Continue anticoagulation        DNR (do not resuscitate)- (present on admission)   Assessment & Plan    Discussed with patient 01/26/18  Patient wants to be DNR / DNI  Patient verbalized understanding of his code status  Updated in EMR to reflect patient wishes        Leukocytosis- (present on admission)   Assessment & Plan    Without  sepsis  Related to infectious etiologies  Continue to montior  Continue abx therapy at this time  Slowly improving        Lytic lesion of bone on x-ray- (present on admission)   Assessment & Plan    Noted on CXRAY  CT chest negative for these  Pending SPEP / UPEP  Underlying metastatic malignancy is seen  Whole body bone scan requested by me        UTI (urinary tract infection)- (present on admission)   Assessment & Plan    This is 2/2 chronic urinary retention and related to ISC at home  Recent culture with E.Coli  Complicated UTI  Will complete seven days of treatment with treatment of PNA  Outpatient urology evaluation recommended        BPH (benign prostatic hyperplasia)- (present on admission)   Assessment & Plan    With urinary retention requiring ISC  Recommend placement for diaz catheter  If issues with placement then urology will be consulted  Hx TURP  Increased Flomax to 0.8 mg  Added finasteride  Outpatient urology follow up recommended        Fall- (present on admission)   Assessment & Plan    No injury noted on Imaging evaluation  PT/OT evaluation  Post acute placement as clinically appropriate        Pneumonia- (present on admission)   Assessment & Plan    This is b/l LL pneumonia  Treated x 5 days with IV ceftriaxone / azithromycin  Transition to PO omnicef tomorrow x 2 more days  Stop dates placed        Syncope- (present on admission)   Assessment & Plan    Related to acute PE  Post acute placement as clinically appropriate        LBBB (left bundle branch block)- (present on admission)   Assessment & Plan    Chronic        CKD (chronic kidney disease) stage 3, GFR 30-59 ml/min- (present on admission)   Assessment & Plan    Monitor renal function / Avoid nephrotoxins and dose medications renally            I personally spend 70 minutes co ordinating care for this patient and discussing with the patient the underlying work up obtained concerning for metastatic malignancy. We discussed his wishes  which are for further evaluation and management at this time. We discussed further evaluation plans / plan of care in detail. All questions / concerns answered. Billing [phone].      Reviewed items::  Labs reviewed, Medications reviewed and Radiology images reviewed  Dee catheter::  Urinary Tract Retention or Urinary Tract Obstruction  DVT prophylaxis pharmacological::  Heparin  Ulcer Prophylaxis::  Not indicated  Antibiotics:  Treating active infection/contamination beyond 24 hours perioperative coverage

## 2018-01-30 NOTE — PROGRESS NOTES
Patient came down to Radiation Therapy Department.  Received 2/5 treatments to lower t-spine.  Will continue as planned.

## 2018-01-30 NOTE — CARE PLAN
Problem: Communication  Goal: The ability to communicate needs accurately and effectively will improve  Outcome: PROGRESSING AS EXPECTED  Patient intermittently confused. Family at bedside. Frequent safety checks and reorientation, falls precautions, bed, chair alarm.     Problem: Discharge Barriers/Planning  Goal: Patient's continuum of care needs will be met  Outcome: PROGRESSING AS EXPECTED  PT/OT/SLP, palliative involved.

## 2018-01-30 NOTE — PROGRESS NOTES
Monitor Summary:    SR with 1*AVB and BBB; occasional PVC, PAC, couplet  HR 66 - 85  0.26 / 0.16 / 0.44

## 2018-01-30 NOTE — THERAPY
"Speech Language Therapy dysphagia treatment completed.   Functional Status: Patient currently on regular diet with thin liquids. Per RN, patient appears to be tolerating diet without difficulty, but does have congested cough. Patient's reporting is inconsistent d/t intermittent confusion, but girlfriend present at bedside and assisted in providing accurate hx. Girlfriend denied patient having any s/sx of aspiration, but did endorse PO intake being poor for past two months PTA. Patient initially refusing PO trials, but with education and encouragement from girlfriend, was agreeable to limited PO trials. Patient consumed PO trials of soft solids, mixed consistencies, one bite of cracker, and thin liquids via cup sip and straw. Patient had productive cough x1 after cup sip of thins, but on further trials of 3 more ounces of thins via cup sip and straw, patient had no further s/sx of aspiration. Laryngeal elevation palpated as complete. At this time, patient appears at the level to continue regular diet with thin liquids w/ intermittent supervision. Please monitor temp and lung sounds closely and page SLP if any s/sx of aspiration arise. RN and girlfriend aware. SLP is following for diet tolerance.     Recommendations: At this time, patient appears at the level to continue regular diet with thin liquids w/ intermittent supervision and use of swallow precautions. Please monitor temp and lung sounds closely and page SLP if any s/sx of aspiration arise.   Plan of Care: Will benefit from Speech Therapy 3 times per week  Post-Acute Therapy: Discharge to a transitional care facility for continued skilled therapy services.    See \"Rehab Therapy-Acute\" Patient Summary Report for complete documentation.     "

## 2018-01-30 NOTE — PROGRESS NOTES
Report received from TEE Eckert. Assumed care of patient at 1845. Telemetry functioning properly. Fall precautions in place. Will continue to monitor.

## 2018-01-30 NOTE — CARE PLAN
Problem: Nutritional:  Goal: Achieve adequate nutritional intake  Patient will consume 50% of meals   Outcome: PROGRESSING SLOWER THAN EXPECTED  Pt taking less than 25% of meals, 100% of boost plus supplements sent TID  Encourage intake  document intake of all meals and supplements as % taken in ADL's to provide interdisciplinary communication across all shifts

## 2018-01-30 NOTE — CONSULTS
"Reason for PC Consult: Advance Care Planning    Consulted by: Dr. Guzman    Assessment:  General: 89 year old male admitted 1/25/18 for recent UTI, falls, left hip pain, left throat pain, and cough. UTI, pneumonia, acute PE, pericarditis with pericardial effusion, and DVT. Patient found to have widely metastatic disease (multiple pulmonary nodules, cervical mediastinal and retroperitoneal adenopathy, multiple bone metastasis involving the spine with destruction at T2, T4-T8). Underwent bone marrow biopsy at L3 and radiation simulation for palliative radiation therapy today. PMH significant for thoracic aortic aneurysm without rupture, BPH, falls, LBBB, and CKD III.     Dyspnea: Yes; present at rest. 93% on 4 LPM via nasal cannula.   Last BM: PTA.    Pain: Yes; pleuritic pain increases with cough; unable to give numeric value.   Depression: No.   Dementia: Unable to Determine.       Spiritual:  Is Yazidism or spirituality important for coping with this illness? No    Has a  or spiritual provider visit been requested? No    Palliative Performance Scale: 40%    Advance Directive: Patient's daughter Amie brought copy of DPOA-HC/Declaration & Living Will. Scanned into EPIC. Please refer to document.     DPOA: Amie Kelly [phone]; first alternate Candace Leticia [phone].   POLST: None on file. Reviewed with Amie.      Code Status: DNR.      Outcome:  14:37 Introduced myself to patient and distant family member Jorge Luis Dunn [phone] at bedside. Discussed role of palliative care He reports \"I'm looking after him while Amie is at work.\" Patient oriented to self only and appears to be confused following conscious sedation medication for bone marrow biopsy earlier today. Jorge Luis confirmed patient has been confused since procedure but his mentation is slowly improving. Assisted patient in repositioning in bed and set up for lunch. Patient gave permission for me to reach out to his daughter Amie. Call placed to Amie. " "Introduced myself and discussed role of palliative care. She reports she will be at the hospital later and agreed to meet at 16:30.     17:27 Met with patient's daughter Amie. She reports she feels like she is \"in shock\" but also \"should have seen the signs\" and discussed patient's physical/mental decline over the last several months. She describes weight lost, poor appetite, and the pleuritic chest pain that the patient is having. Amie shared that she cared for her  for over 10 years who  of renal failure. He was on hospice for only one day. She stated \"I am used to that but, I imagine this is quicker. I am not familiar with cancer.\" She asked about the biopsy results but stated \"I guess it doesn't make a huge difference because its in so many parts of his body.\" She then stated \"he wanted to find what it is.\" Discussed that she is accurate in her interpretation.    Discussed goals of care. Recommended a day-by-day approach to see how patient responds to current to current palliative treatment. Will attempt to discuss goals of care when patient has clearer mentation. Discussed various options including DNR/selective treatment to hospice care in various settings. All questions answered. Provided business card and encouraged Amie to call with any questions, needs, or concerns.      Updated: Bedside RN.    Plan: DNR/full treatment at this time. Receiving palliative radiation. Discuss goals of care further when patient's mentation clears. Amie to call when she wants to meet/discuss goals of care further.    Recommendations: I do not recommend an ethics or hospice consult at this time because patient is recieving palliative radiation treatment though patient's daughter and I discussed hospice care; no ethical dilemma exists at this time..    Thank you for allowing Palliative Care to participate in this patient's care. Please feel free to call [phone] with any questions or concerns.  "

## 2018-01-31 PROBLEM — C79.51 METASTATIC ADENOCARCINOMA TO BONE (HCC): Status: ACTIVE | Noted: 2018-01-01

## 2018-01-31 NOTE — PROGRESS NOTES
"[facility] Hospitalist Progress Note    Date of Service: 2018    Chief Complaint  89 y.o. male admitted 2018 with Fall, Hip pain, cough. He is found to have pneumonia, UTI, acute PE, pericarditis with pericardial effusion, DVT and metastatic malignancy of unknown primary at this time. Had L3 biopsy performed by IR.    Interval Problem Update  Daughter at the bedside. Had an extended discussion about what has happened in his hospitalization and answered the questions of the patient and daughter. Today he states that he \"feels older\" but can't describe it further. No acute overnight events.    Consultants/Specialty  Cardiology - [facility]  Radiation oncology - Dr Betancourt   Oncology - Dr Batista  Neurosurgery - Dr Carlson    Disposition  Can be transferred to oncology.  Pending medical clearance and goals of care.        Review of Systems   Constitutional: Positive for malaise/fatigue. Negative for chills, diaphoresis and fever.   HENT: Positive for hearing loss. Negative for tinnitus.    Eyes: Negative for blurred vision and double vision.   Respiratory: Positive for cough. Negative for sputum production and shortness of breath.    Cardiovascular: Positive for chest pain (Rib pain, improved). Negative for palpitations, orthopnea, claudication, leg swelling and PND.   Gastrointestinal: Negative for abdominal pain, nausea and vomiting.   Musculoskeletal: Negative for back pain, falls and joint pain.   Skin: Negative for itching and rash.   Neurological: Positive for weakness. Negative for dizziness, loss of consciousness and headaches.   Psychiatric/Behavioral: Negative.       Physical Exam  Laboratory/Imaging   Hemodynamics  Temp (24hrs), Av.5 °C (97.7 °F), Min:36.1 °C (97 °F), Max:37 °C (98.6 °F)   Temperature: 36.5 °C (97.7 °F)  Pulse  Av.8  Min: 55  Max: 107   Blood Pressure : 121/66      Respiratory      Respiration: 16, Pulse Oximetry: 92 %     Work Of Breathing / Effort: Mild  RUL Breath " Sounds: Diminished;Clear, RML Breath Sounds: Diminished;Clear, RLL Breath Sounds: Diminished;Clear, OBED Breath Sounds: Diminished;Clear, LLL Breath Sounds: Diminished;Clear    Fluids    Intake/Output Summary (Last 24 hours) at 01/31/18 1500  Last data filed at 01/31/18 1300   Gross per 24 hour   Intake              720 ml   Output              525 ml   Net              195 ml       Nutrition  Orders Placed This Encounter   Procedures   • DIET ORDER     Standing Status:   Standing     Number of Occurrences:   1     Order Specific Question:   Diet:     Answer:   Regular [1]     Physical Exam   Constitutional: He is oriented to person, place, and time. He appears well-developed. No distress.   frail   HENT:   Head: Normocephalic.   Mouth/Throat: Oropharynx is clear and moist.   Eyes: EOM are normal. Pupils are equal, round, and reactive to light. No scleral icterus.   Neck: Normal range of motion. Neck supple.   Cardiovascular: Normal rate, regular rhythm and intact distal pulses.    Pulmonary/Chest: No stridor. No respiratory distress. He has no wheezes. He has rales (at the bases).   Abdominal: Soft. He exhibits no distension. There is no tenderness.   Musculoskeletal: He exhibits no tenderness or deformity.   Neurological: He is alert and oriented to person, place, and time. He has normal reflexes.   Skin: Skin is warm and dry.   Psychiatric: He has a normal mood and affect. His behavior is normal.   Vitals reviewed.      Recent Labs      01/29/18 0218 01/30/18   0254  01/31/18   0232   WBC  12.1*  9.6  22.0*   RBC  4.48*  4.52*  4.56*   HEMOGLOBIN  13.4*  13.1*  13.3*   HEMATOCRIT  40.3*  41.1*  41.2*   MCV  90.0  90.9  90.4   MCH  29.9  29.0  29.2   MCHC  33.3*  31.9*  32.3*   RDW  47.8  48.3  48.0   PLATELETCT  311  299  366   MPV  10.2  10.0  10.0     Recent Labs      01/29/18   0218  01/30/18   0254  01/31/18   0232   SODIUM  137  139  137   POTASSIUM  3.9  4.3  4.3   CHLORIDE  105  106  107   CO2  25  22   20   GLUCOSE  106*  132*  135*   BUN  18  25*  40*   CREATININE  0.99  0.95  0.88   CALCIUM  8.6  8.6  7.8*     Recent Labs      01/29/18   0218   01/30/18   0645  01/30/18   1333  01/30/18   1950   APTT  52.8*   < >  97.8*  83.6*  55.4*   INR  1.20*   --    --    --    --     < > = values in this interval not displayed.                  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 Right hepatic metastasis, gastrohepatic ligament LN, T2, L3 osteolytic lesions, Left and Right rib lesions and, multiple pulmonary nodules. Complicated by VTE. Discussed palliative care and hospice as options, recommended that they discuss it with oncology as well.    - palliative care consulted placed  - Radiation oncology on board  - Oncology consulted, greatly appreciate  - Dexamethasone for cord compression        Acute hypoxemic respiratory failure (CMS-HCC)- (present on admission)   Assessment & Plan    PNA, continue management of infectious concerns. Elevated BNP / Pleural effusions noted  - continue cefdinir  - monitor clinically  - RT to follow        Cord compression (CMS-HCC)- (present on admission)   Assessment & Plan    At T5, seen on MRI. Previously discussed with neurosurg, no intervention at that time.  - Radiation oncology on board  - radiation treatment 3/5 today  - continue dexamethasone for now  Continue goals of care conversations and may need neurosurgery consult again        DVT (deep venous thrombosis) (CMS-HCC)- (present on admission)   Assessment & Plan    Acute, B/L LE. Likely 2/2 malignancy. Can be transitioned to oral regimen if decided that no procedures are to be taking place.  - continue heparin drip        Pleural effusion- (present on admission)   Assessment & Plan    B/L. Likely para pneumonic vs malignant. Currently small  - monitor clinically  - consider  diuresis and repeat imaging        Pneumonia- (present on admission)   Assessment & Plan    Bilateral LL pneumonia. Symptomatically improving.  - s/p ceftriaxone and azithromycin x5 days  - omnicef to complete 7 day course  - RT to follow        Pericardial effusion- (present on admission)   Assessment & Plan    Noted on echocardiogram and CT. Complicated by pericarditis. Malignant etiologies cannot be ruled out  - cardiology has evaluated, greatly appreciate  - Continue colchicine x6 weeks  - Interval TTE in 2-4 weeks recommended        Thoracic aortic aneurysm without rupture (CMS-HCC)- (present on admission)   Assessment & Plan    Outpatient surveillance per PCP recommended and VS evaluation recommended        DNR (do not resuscitate)- (present on admission)   Assessment & Plan    Discussed with patient 01/26/18  Patient wants to be DNR / DNI  Patient verbalized understanding of his code status  Updated in EMR to reflect patient wishes        Leukocytosis- (present on admission)   Assessment & Plan    Without sepsis. Resolved to 9.6 on 1/30 but with 90% neutrophils. Now with increase to 22. Stress response from radiation vs pneumonia? VSS  - monitor clinically        UTI (urinary tract infection)- (present on admission)   Assessment & Plan    This is 2/2 chronic urinary retention and related to ISC at home. Recent culture with E.Coli. Complicated UTI  - s/p 5 day course  - outpatient urology evaluation recommended        BPH (benign prostatic hyperplasia)- (present on admission)   Assessment & Plan    With urinary retention. H/o TURP  - tolerating increased flomax and added finasteride  - outpatient urology follow up  - encourage mobilization as able  - diaz        CKD (chronic kidney disease) stage 3, GFR 30-59 ml/min- (present on admission)   Assessment & Plan    Monitor renal function / Avoid nephrotoxins and dose medications renally              Reviewed items::  Labs reviewed, Medications reviewed and  Radiology images reviewed  Dee catheter::  Urinary Tract Retention or Urinary Tract Obstruction  DVT prophylaxis pharmacological::  Heparin  Ulcer Prophylaxis::  Not indicated

## 2018-01-31 NOTE — THERAPY
"Physical Therapy Treatment completed.   Bed Mobility:  Supine to Sit: Minimal Assist (slightly raised HOB, pullong on therapist )  Transfers: Sit to Stand: Minimal Assist (with FWW from bed; )  Gait: Level Of Assist: Minimal Assist (to mod assist ) with Front-Wheel Walker       Plan of Care: Will benefit from Physical Therapy 3 times per week  Discharge Recommendations: Equipment: Will Continue to Assess for Equipment Needs.     Pt with less walking tolerance this session, impaired motor control on right LE during ambulation with right lean to greater degress as ambulation continued (UE equal, no facial involvement noted). Remains confused and fatigued. Acute PT will continue to follow, in current condition would require SNF.    See \"Rehab Therapy-Acute\" Patient Summary Report for complete documentation.       "

## 2018-01-31 NOTE — PROGRESS NOTES
Monitor Summary:    SR with BBB; frequent PVC, rare couplet, bigem, PAC, short burst of Afib  HR 66 - 81  0.18 / 0.16 / 0.44

## 2018-01-31 NOTE — CARE PLAN
Problem: Bowel/Gastric:  Goal: Normal bowel function is maintained or improved  Outcome: PROGRESSING AS EXPECTED   01/31/18 1312   OTHER   Last BM 01/31/18   Number of Times Stooled 1       Problem: Respiratory:  Goal: Respiratory status will improve  Outcome: PROGRESSING SLOWER THAN EXPECTED  Pt O2 needs continue, pt not on home O2

## 2018-01-31 NOTE — PROGRESS NOTES
[facility] Hospitalist Progress Note    Date of Service: 2018    Chief Complaint  89 y.o. male admitted 2018 with Fall, Hip pain, cough. He is found to have pneumonia, UTI, acute PE, pericarditis with pericardial effusion, DVT and metastatic malignancy of unknown primary at this time. Had L3 biopsy performed by IR.    Interval Problem Update  About to go down for radiation. No complaints of pain, feels well at the moment. No complaints of weakness of his lower extremities. No concerns from bedside nurse at this time. No acute overnight events.    Consultants/Specialty  Cardiology - [facility]  Radiation oncology - Dr Betancourt   Oncology - Dr Batista  Neurosurgery - Dr Carlson    Disposition  Can be transferred to oncology  Consider post acute placement as clinically appropriate        Review of Systems   Constitutional: Positive for malaise/fatigue. Negative for chills, diaphoresis and fever.   HENT: Positive for hearing loss. Negative for tinnitus.    Eyes: Negative for blurred vision and double vision.   Respiratory: Positive for cough and sputum production. Negative for hemoptysis, shortness of breath and wheezing.    Cardiovascular: Positive for chest pain (Rib pain, improved). Negative for palpitations, orthopnea, claudication, leg swelling and PND.   Gastrointestinal: Negative for abdominal pain, nausea and vomiting.   Musculoskeletal: Negative for falls and joint pain.   Skin: Negative for itching and rash.   Neurological: Negative for dizziness, loss of consciousness, weakness and headaches.   Psychiatric/Behavioral: Negative.       Physical Exam  Laboratory/Imaging   Hemodynamics  Temp (24hrs), Av.4 °C (97.6 °F), Min:36.1 °C (97 °F), Max:37 °C (98.6 °F)   Temperature: 37 °C (98.6 °F)  Pulse  Av  Min: 55  Max: 107   Blood Pressure : 118/67      Respiratory      Respiration: 18, Pulse Oximetry: 91 %     Work Of Breathing / Effort: Mild  RUL Breath Sounds: Diminished;Clear, RML Breath  Sounds: Diminished;Clear, RLL Breath Sounds: Diminished;Clear, OBED Breath Sounds: Diminished;Clear, LLL Breath Sounds: Diminished;Clear    Fluids    Intake/Output Summary (Last 24 hours) at 01/30/18 2008  Last data filed at 01/30/18 1800   Gross per 24 hour   Intake              540 ml   Output              850 ml   Net             -310 ml       Nutrition  Orders Placed This Encounter   Procedures   • DIET ORDER     Standing Status:   Standing     Number of Occurrences:   1     Order Specific Question:   Diet:     Answer:   Regular [1]     Physical Exam   Constitutional: He is oriented to person, place, and time. He appears well-developed. No distress.   frail   HENT:   Head: Normocephalic.   Mouth/Throat: Oropharynx is clear and moist.   Eyes: EOM are normal. Pupils are equal, round, and reactive to light. No scleral icterus.   Neck: Normal range of motion. Neck supple. No JVD present.   Cardiovascular: Normal rate and regular rhythm.    Murmur heard.  Pulmonary/Chest: No stridor. No respiratory distress. He has no wheezes. He has rales.   Abdominal: Soft. Bowel sounds are normal. He exhibits no distension. There is no tenderness.   Musculoskeletal: He exhibits no tenderness or deformity.   Neurological: He is alert and oriented to person, place, and time. He has normal reflexes.   Skin: Skin is warm and dry.   Psychiatric: He has a normal mood and affect. His behavior is normal.   Vitals reviewed.      Recent Labs      01/28/18   0347 01/29/18 0218 01/30/18   0254   WBC  11.6*  12.1*  9.6   RBC  4.44*  4.48*  4.52*   HEMOGLOBIN  12.9*  13.4*  13.1*   HEMATOCRIT  40.4*  40.3*  41.1*   MCV  91.0  90.0  90.9   MCH  29.1  29.9  29.0   MCHC  31.9*  33.3*  31.9*   RDW  48.0  47.8  48.3   PLATELETCT  298  311  299   MPV  10.4  10.2  10.0     Recent Labs      01/28/18   0347 01/29/18   0218  01/30/18   0254   SODIUM  136  137  139   POTASSIUM  3.7  3.9  4.3   CHLORIDE  105  105  106   CO2  22  25  22   GLUCOSE  97   106*  132*   BUN  19  18  25*   CREATININE  1.07  0.99  0.95   CALCIUM  8.3*  8.6  8.6     Recent Labs      01/29/18   0218   01/29/18   2200  01/30/18   0645  01/30/18   1333   APTT  52.8*   < >  49.0*  97.8*  83.6*   INR  1.20*   --    --    --    --     < > = values in this interval not displayed.                  Assessment/Plan     Metastatic neoplastic disease (CMS-HCC)- (present on admission)   Assessment & Plan    Metastatic disease of unknown primary site at this time. Right hepatic metastasis, gastrohepatic ligament LN, T2, L3 osteolytic lesions, multiple pulmonary nodules. Complicated by VTE. PSA low.  - Patient at this time wants to pursue further work up and management  - f/u whole body skeletal survey  - Radiation oncology on board  - Oncology consultated  - F/U biopsy results from L3 biopsy.  - SPEP / UPEP pending  - Dexamethasone for cord compression    Recommend palliative consultation and consideration towards hospice care        Acute hypoxemic respiratory failure (CMS-HCC)- (present on admission)   Assessment & Plan    PNA, continue management of infectious concerns. Elevated BNP / Pleural effusions noted  - continue cefdinir  - monitor clinically  - RT to follow        Cord compression (CMS-HCC)- (present on admission)   Assessment & Plan    At T5, seen on MRI. Previously discussed with neurosurg, no intervention at that time.  - Radiation oncology on board  - radiation treatment 2/5 today  - continue dexamethasone for now  Continue goals of care conversations and may need neurosurgery consult again        DVT (deep venous thrombosis) (CMS-[facility])- (present on admission)   Assessment & Plan    Acute, B/L LE. Likely 2/2 malignancy  - on heparin drip        Pulmonary embolism (CMS-HCC)- (present on admission)   Assessment & Plan    Clinically. No CTA performed. Treating as acute B/L PE, given b/l pleuritic pain.   Diagnostic evaluation will not change underlying management at this time  - continue IV  heparin, monitor APTT, CBC  - Consider transitioning to SC lovenox over the coming days if no procedures are planned          Pleural effusion- (present on admission)   Assessment & Plan    B/L. Likely para pneumonic vs malignant. Currently small  - monitor clinically  - consider diuresis and repeat imaging        Pericarditis- (present on admission)   Assessment & Plan    Per Dr Crow with underlying pericardial effusion and chest pain  Recommend colchicine  Monitor for symptomatic improvement  Underlying malignant etiologies cannot be ruled out at this time        Elevated troponin- (present on admission)   Assessment & Plan    PE / Pericarditis related  Evaluated by cardiology this hospital stay        Pericardial effusion- (present on admission)   Assessment & Plan    Noted on echocardiogram and CT. Suspected pericarditis  - Continue colchicine  - Malignant etiologies cannot be ruled out  - Appreciated cardiology input  Interval TTE in 2-4 weeks recommended        Thoracic aortic aneurysm without rupture (CMS-HCC)- (present on admission)   Assessment & Plan    Outpatient surveillance per PCP recommended and VS evaluation recommended        Chest pain- (present on admission)   Assessment & Plan    Pleuritic in etiology  Related to pneumonia, PE and pericarditis  Continue Colchicine  Continue management of pneumonia / BD regimen / RT care  Continue anticoagulation        DNR (do not resuscitate)- (present on admission)   Assessment & Plan    Discussed with patient 01/26/18  Patient wants to be DNR / DNI  Patient verbalized understanding of his code status  Updated in EMR to reflect patient wishes        Leukocytosis- (present on admission)   Assessment & Plan    Without sepsis. Resolved.        UTI (urinary tract infection)- (present on admission)   Assessment & Plan    This is 2/2 chronic urinary retention and related to ISC at home. Recent culture with E.Coli. Complicated UTI  - s/p 5 day course  - outpatient  urology evaluation recommended        BPH (benign prostatic hyperplasia)- (present on admission)   Assessment & Plan    With urinary retention. H/o TURP  - tolerating increased flomax and added finasteride  - outpatient urology follow up  - diaz        Pneumonia- (present on admission)   Assessment & Plan    This is b/l LL pneumonia  Treated x 5 days with IV ceftriaxone / azithromycin  Transition to PO omnicef tomorrow x 2 more days        Syncope- (present on admission)   Assessment & Plan    Related to acute PE  Post acute placement as clinically appropriate        CKD (chronic kidney disease) stage 3, GFR 30-59 ml/min- (present on admission)   Assessment & Plan    Monitor renal function / Avoid nephrotoxins and dose medications renally              Reviewed items::  Labs reviewed, Medications reviewed and Radiology images reviewed  Diaz catheter::  Urinary Tract Retention or Urinary Tract Obstruction  DVT prophylaxis pharmacological::  Heparin  Ulcer Prophylaxis::  Not indicated

## 2018-01-31 NOTE — CARE PLAN
Problem: Respiratory:  Goal: Respiratory status will improve  Outcome: PROGRESSING SLOWER THAN EXPECTED  Pt on 4L nasal cannula. Risk for aspiration noted, pt sat fully upright for medication administration. Pt tolerated pills in apple sauce. Cough noted after sipping water. Frequent coughing and throat clearing encouraged. Oral suctioning reviewed with patient, pt declined suctioning at time of assessment. Speech and language consult already in place. Will continue to monitor.

## 2018-01-31 NOTE — PROGRESS NOTES
Patient brought down to Radiation Therapy and received treatment 3 out of 5 for the lower T spine and 1 of 5 for the upper Tspine. Patient tolerated procedure well. Will continue treatment as planned.

## 2018-01-31 NOTE — CONSULTS
Consult Note: Oncology    Date of consultation: 1/30/2018 4:26 PM    Referring provider: Dr Guzman    Reason for consultation: Metastatic pulmonary adenocarcinoma    History of presenting illness:       Mikey Kelly   is a 89 y.o. year old male with a very remote history of light smoking , who was admitted to the hospital with failure to thrive symptoms, hip pain and UTI.    . Unfortunately, he is a very poor historian. No family was available today. Subsequent workup showed widespread  Metastatic carcinoma with multiple pulmonary nodules, mediastinal and cervical adenopathy, hepatic mets  and bony lytic lesions. MRI showssignificant destruction at T4 through 8 with potential compromise of the spinal cord. Also an area T2 is identified with disease  encroaching on the cord.     . He was started on palliative radiation. Family was not at the bedside today. He denies any discomfort. He appears not to comprehends that he has malignancy. He also has DVT/pericarditis with minimal effusion. He has been on steroids, which appears to have helped.    Past Medical History:    Past Medical History:   Diagnosis Date   • BPH (benign prostatic hyperplasia)    • Dental disorder     upper   • Hard of hearing    • Memory loss        Past surgical history:    Past Surgical History:   Procedure Laterality Date   • CYSTOSCOPY  1/14/2013    Performed by Bradley Sales M.D. at SURGERY [facility]   • TRANS URETHRAL RESECTION PROSTATE  1/14/2013    Performed by Bradley Sales M.D. at SURGERY [facility]   • OTHER ABDOMINAL SURGERY  1940`s    hernia repair, appy   • OTHER ORTHOPEDIC SURGERY      right  rotator cuff repair       Allergies:  Pcn [penicillins] and Sulfa drugs    Medications:    Current Facility-Administered Medications   Medication Dose Route Frequency Provider Last Rate Last Dose   • ipratropium-albuterol (DUONEB) nebulizer solution 3 mL  3 mL Nebulization Q4H PRN (RT) Markus Boyer M.D.       • dexamethasone  (DECADRON) injection 6 mg  6 mg Intravenous Q6HRS Hedy ROSS M.D.   6 mg at 01/30/18 1211   • cefdinir (OMNICEF) capsule 300 mg  300 mg Oral Q12HRS Desire Guzman M.D.   300 mg at 01/30/18 0958   • morphine (pf) 4 mg/ml injection 2-4 mg  2-4 mg Intravenous Q4HRS PRN Desire Guzman M.D.   2 mg at 01/29/18 2314   • heparin injection 3,200 Units  3,200 Units Intravenous PRN Desire Guzman M.D.   3,200 Units at 01/30/18 0102    And   • heparin infusion 25,000 units in 500 ml 0.45% nacl   Intravenous Continuous Desire Guzman M.D. 29 mL/hr at 01/30/18 1400 1,450 Units/hr at 01/30/18 1400   • colchicine (COLCRYS) tablet 0.6 mg  0.6 mg Oral BID Desire Guzman M.D.   0.6 mg at 01/30/18 0958   • tamsulosin (FLOMAX) capsule 0.8 mg  0.8 mg Oral QHS Desire Guzman M.D.   0.8 mg at 01/29/18 2211   • finasteride (PROSCAR) tablet 5 mg  5 mg Oral QHS Desire Guzman M.D.   5 mg at 01/29/18 2211   • Pharmacy Consult Request 1 Each  1 Each Other PRN Markus Boyer M.D.       • Respiratory Care per Protocol   Nebulization Continuous RT Markus Boyer M.D.       • acetaminophen (TYLENOL) tablet 650 mg  650 mg Oral Q6HRS PRN Markus Boyer M.D.   650 mg at 01/27/18 1722   • ondansetron (ZOFRAN) syringe/vial injection 4 mg  4 mg Intravenous Q4HRS PRN Markus Boyer M.D.       • ondansetron (ZOFRAN ODT) dispertab 4 mg  4 mg Oral Q4HRS PRN Markus Boyer M.D.           Social History:     Social History     Social History   • Marital status: Single     Spouse name: N/A   • Number of children: N/A   • Years of education: N/A     Occupational History   • Not on file.     Social History Main Topics   • Smoking status: Former Smoker     Packs/day: 0.50     Years: 25.00     Types: Cigarettes   • Smokeless tobacco: Never Used   • Alcohol use Yes      Comment: occ   • Drug use: No   • Sexual activity: Not Currently     Other Topics Concern   • Not on file     Social History Narrative   • No narrative on file       Family  "History:   No family history on file.    Review of Systems:   , Unable to obtain      Physical Exam:  Vitals:   /56   Pulse 75   Temp 36.3 °C (97.3 °F)   Resp 17   Ht 1.981 m (6' 6\")   Wt 96.2 kg (212 lb 1.3 oz)   SpO2 91%   BMI 24.51 kg/m²      General: Not in acute distress, alert and oriented x 3, emaciated  HEENT: No pallor, icterus. Oropharynx clear.   Neck: Supple, no palpable masses.  Lymph nodes: No palpable cervical, supraclavicular, axillary or inguinal lymphadenopathy.  Cervical lymphadenopathy. No clearly palpable on physical exam  CVS: regular rate and rhythm, no rubs or gallops. Murmur present   RESP: Clear to auscultate bilaterally, no wheezing or crackles.   ABD: Soft, non tender, non distended, positive bowel sounds, no palpable organomegaly  EXT: No edema or cyanosis  CNS: Alert and oriented x3, No focal deficits.  Skin- No rash      Labs:   Recent Labs      01/28/18   0347   01/29/18   0218   01/29/18   2200  01/30/18   0254  01/30/18   0645  01/30/18   1333   RBC  4.44*   --   4.48*   --    --   4.52*   --    --    HEMOGLOBIN  12.9*   --   13.4*   --    --   13.1*   --    --    HEMATOCRIT  40.4*   --   40.3*   --    --   41.1*   --    --    PLATELETCT  298   --   311   --    --   299   --    --    PROTHROMBTM   --    --   14.9*   --    --    --    --    --    APTT   --    < >  52.8*   < >  49.0*   --   97.8*  83.6*   INR   --    --   1.20*   --    --    --    --    --     < > = values in this interval not displayed.     Lab Results   Component Value Date/Time    SODIUM 139 01/30/2018 02:54 AM    POTASSIUM 4.3 01/30/2018 02:54 AM    CHLORIDE 106 01/30/2018 02:54 AM    CO2 22 01/30/2018 02:54 AM    GLUCOSE 132 (H) 01/30/2018 02:54 AM    BUN 25 (H) 01/30/2018 02:54 AM    CREATININE 0.95 01/30/2018 02:54 AM    CREATININE 1.2 09/09/2008 09:51 AM        Assessment and Plan:  Metastatic pulmonary adenocarcinoma in a elderly patient with remote history of light smoking-    .He is not a good " historian, unclear whether he has component of dementia. According to the available records, he expressed desire to pursue active management and workup    Discuss this case with pulmonary who is favoring lung adenocarcinoma.. I will ask PDL1 and EGFR mutation testing to be done for targeted treatment or immunotherapy. Given his light smoking histor, there is a high chance of finding actionable mutation    Obviously not a good candidate for systemic chemotherapy. I will discuss with his daughter about hospice/palliative care and see what the consensus is. If he has actionable mutation, consideration can be given for targeted treatment with minimal side effects.     MS changes- unclear whether he has underlying dementia. He has high probability of having brain metastases. If the family decides to pursue active management. He will need an MRI of the brain with contrast. Prior CTs of the head was done without contrast.    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 Symptoms appears to have improved with steroids    Complex patient requiring complex decision making. I have extensively reviewed her prior medical records as part of establishing care with me and formulated the plan.    Please note that this dictation was created using voice recognition software. I have made every reasonable attempt to correct obvious errors, but I expect that there are errors of grammar and possibly content that I did not discover before finalizing the note.      SIGNATURES:  Amado Batista    CC:  Terri Zurita, SHELBYROlivaN.  No ref. provider found

## 2018-01-31 NOTE — PROGRESS NOTES
Assumed care of pt.  Bedside report received and whiteboard updated. Discussed plan of care for the day and answered questions.  Chart and MAR reviewed.  Call light and personal belongings are within reach.  Bed in low position and locked. Pt has no needs at this time.    Dentures: upper dentures in place  Glasses: denies  Hearing aides: denies

## 2018-01-31 NOTE — PROGRESS NOTES
During skin assessment open wound found between buttocks and sacrum red and slow to shahida. Dry thin discolored skin noted on left hip    Camilla cream ordered  Waffle mattress placed  Wound consult placed  mepilex lite placed on left hip  Wound LDA opened  Wound photos taken and uploaded  Q2 turns in place

## 2018-01-31 NOTE — PROGRESS NOTES
Report received from TEE Iniguez. Assumed care of patient at 1845. Telemetry functioning properly. Fall precautions in place. Will continue to monitor.

## 2018-01-31 NOTE — PROGRESS NOTES
Date of visit: 1/31/2018  2:55 PM      Chief Complaint- denies any acute complaints  -Newly diagnosed metastatic lung adenocarcinoma. Per pathology  -Denies significant pain. Patient is bedbound    Past Medical History:      Past Medical History:   Diagnosis Date   • BPH (benign prostatic hyperplasia)    • Dental disorder     upper   • Hard of hearing    • Memory loss        Past surgical history:       Past Surgical History:   Procedure Laterality Date   • CYSTOSCOPY  1/14/2013    Performed by Bradley Sales M.D. at SURGERY [facility]   • TRANS URETHRAL RESECTION PROSTATE  1/14/2013    Performed by Bradley Sales M.D. at SURGERY [facility]   • OTHER ABDOMINAL SURGERY  1940`s    hernia repair, appy   • OTHER ORTHOPEDIC SURGERY      right  rotator cuff repair       Allergies:       Pcn [penicillins] and Sulfa drugs    Medications:         Current Facility-Administered Medications   Medication Dose Route Frequency Provider Last Rate Last Dose   • ipratropium-albuterol (DUONEB) nebulizer solution 3 mL  3 mL Nebulization Q4H PRN (RT) Markus Boyer M.D.       • dexamethasone (DECADRON) injection 6 mg  6 mg Intravenous Q6HRS Hedy ROSS M.D.   6 mg at 01/31/18 1303   • cefdinir (OMNICEF) capsule 300 mg  300 mg Oral Q12HRS Desire Guzman M.D.   300 mg at 01/31/18 0846   • morphine (pf) 4 mg/ml injection 2-4 mg  2-4 mg Intravenous Q4HRS PRN Desire Guzman M.D.   2 mg at 01/29/18 2314   • heparin injection 3,200 Units  3,200 Units Intravenous PRN Desire Guzman M.D.   3,200 Units at 01/30/18 0102    And   • heparin infusion 25,000 units in 500 ml 0.45% nacl   Intravenous Continuous Desire Guzman M.D. 29 mL/hr at 01/31/18 0705 1,450 Units/hr at 01/31/18 0705   • colchicine (COLCRYS) tablet 0.6 mg  0.6 mg Oral BID Desire Guzman M.D.   0.6 mg at 01/31/18 0846   • tamsulosin (FLOMAX) capsule 0.8 mg  0.8 mg Oral QHS Desire Guzman M.D.   0.8 mg at 01/30/18 2055   • finasteride (PROSCAR) tablet 5 mg  5 mg Oral QHS  Desire Guzman M.D.   5 mg at 01/30/18 2055   • Pharmacy Consult Request 1 Each  1 Each Other PRN Markus Boyer M.D.       • Respiratory Care per Protocol   Nebulization Continuous RT Markus Boyer M.D.       • acetaminophen (TYLENOL) tablet 650 mg  650 mg Oral Q6HRS PRN Markus Boyer M.D.   650 mg at 01/27/18 1722   • ondansetron (ZOFRAN) syringe/vial injection 4 mg  4 mg Intravenous Q4HRS PRGRIS Boyer M.D.       • ondansetron (ZOFRAN ODT) dispertab 4 mg  4 mg Oral Q4HRS PRN Markus Boyer M.D.             Social History:     Social History     Social History   • Marital status: Single     Spouse name: N/A   • Number of children: N/A   • Years of education: N/A     Occupational History   • Not on file.     Social History Main Topics   • Smoking status: Former Smoker     Packs/day: 0.50     Years: 25.00     Types: Cigarettes   • Smokeless tobacco: Never Used   • Alcohol use Yes      Comment: occ   • Drug use: No   • Sexual activity: Not Currently     Other Topics Concern   • Not on file     Social History Narrative   • No narrative on file       Family History:    No family history on file.    Review of Systems:  All other review of systems are negative except what was mentioned above in the HPI.    Constitutional: Negative for fever, chills, positive for weight loss and malaise/fatigue.    HEENT: No new auditory or visual complaints. No sore throat and neck pain.     Respiratory: Negative for cough, sputum production, shortness of breath and wheezing.    Cardiovascular: Negative for chest pain, palpitations, orthopnea and leg swelling.    Gastrointestinal: Negative for heartburn, nausea, vomiting and abdominal pain.    Genitourinary: Negative for dysuria, hematuria    Musculoskeletal: Improved back pain  Skin: Negative for rash and itching.    Neurological: Negative for focal weakness and headaches.    Endo/Heme/Allergies: No abnormal bleed/bruise.    Psychiatric/Behavioral: No new  "depression/anxiety.    Physical Exam:  Vitals: /66   Pulse 67   Temp 36.5 °C (97.7 °F)   Resp 16   Ht 1.981 m (6' 6\")   Wt 98.6 kg (217 lb 6 oz)   SpO2 92%   BMI 25.12 kg/m²     General: Not in acute distress, alert and oriented x 3  HEENT: No pallor, icterus. Oropharynx clear.   Neck: Supple, no palpable masses.  Lymph nodes: No palpable cervical, supraclavicular, axillary or inguinal lymphadenopathy.    CVS: regular rate and rhythm, no rubs or gallops  RESP: Clear to auscultate bilaterally, no wheezing or crackles.   ABD: Soft, non tender, non distended, positive bowel sounds, no palpable organomegaly  EXT: No edema or cyanosis  CNS: Alert and oriented x3, No focal deficits.  Skin- No rash      Labs:   Admission on 01/25/2018   No results displayed because visit has over 200 results.          Assessment and Plan:  Metastatic pulmonary adenocarcinoma in a elderly patient with remote history of light smoking-     .He is not a good historian, unclear whether he has component of dementia. . He has poor understanding of his disease.    At separate phone conversation with his daughter who is involved in his care. Discussed the fact that he has incurable metastatic disease from the lung with the all associated complications as above.    Discussed the option of  hospice versus pursuing systemic therapy, which will need mutation testing and testing for immunotherapy.     Strongly encouraged hospice. His daughter who is leaning towards making him hospice, which is very reasonable.  Can finish current palliative radiation for quality of life/pain control.  . She wants some more time to discuss with the family and finalize her decision. She will inform the primary team.    MS changes- unclear whether he has underlying dementia. He has high probability of having brain metastases. Hold off on imaging as he is pursuing hospice.   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 Symptoms appears to have improved with steroids      He agreed and verbalized his agreement and understanding with the current plan.  I answered all questions and concerns he has at this time         Please note that this dictation was created using voice recognition software. I have made every reasonable attempt to correct obvious errors, but I expect that there are errors of grammar and possibly content that I did not discover before finalizing the note.      SIGNATURES:  Amado Batista    CC:  SHELBY VargasROlivaN.  No ref. provider found

## 2018-01-31 NOTE — CARE PLAN
Problem: Psychosocial Needs:  Goal: Level of anxiety will decrease  Outcome: PROGRESSING AS EXPECTED   01/30/18 2000   OTHER   Patient Behaviors Fatigue;Flat Affect     Pt is AO x 2-3, disoriented to year and place. Frequent reorientation implemented. Pt calm tonight and seems to be in good spirits although flat affect noted. Will continue to monitor.

## 2018-02-01 NOTE — PROGRESS NOTES
Received report on pt at the beginning of shift.  Pt arrived to the unit at about 2200 and assumed care of the pt at that time.  Pt oriented to the room and unit.  Pt is unhappy he was woken up to be transferred at this time.  Made pt as comfortable as possible.  Pt received his night time medications with no issues.  Adjusted heparin gtt from 2030 with a second Rn.  Two RN skin check completed.  Pt sacrum/buttock area red, but blanchable.  All skin in tact, no open wounds.  No complaints or needs at this time.  Will continue to monitor.

## 2018-02-01 NOTE — DISCHARGE PLANNING
Staffed case with palliative RN Gena who reports plan is for pt to d/c to daughter's home with hospice (Daughter Amie Kelly, [address] [place] [address] P:[phone]).  Family has chosen [facility].  SW will fax choice form to JORGE Jewell in order to initiate referral.  RN states MD will put in order for hospice.

## 2018-02-01 NOTE — PALLIATIVE CARE
"Palliative Care follow-up  Received call from patient's daughter/DPOA-HC Amie Kelly, returned call. She has spoken with [facility] and would like to move forward with this discharge plan at her home. Discussed 2/5 palliative radiation treatments today. Met with patient who stated \"I'll have to think about that\" and then stated he thinks he was incontinent of stool. Discussed with CAROLEA. Spoke with Dr. Hoover who feels patient is not capacitated for choice; requested order for hospice referral. Discussed with JANETTE and provided choice form for [facility] per my discussion with patient's daughter.     Updated: Dr. Hoover/JANETTE/RN/CNA.    Plan: DNR; palliative radiation followed by hospice discharge to daughter/DPOA-HC's home.     Recommendations: I recommend a hospice consult.    Thank you for allowing Palliative Care to participate in this patient's care. Please feel free to call [phone] with any questions or concerns.  "

## 2018-02-01 NOTE — THERAPY
"Occupational Therapy Treatment completed with focus on ADLs and ADL transfers.  Functional Status: Min A supine > EOB, mod A of 2 sit to stand from lower surface (toilet), max A LB dressing and toilet hygiene  Plan of Care: Will benefit from Occupational Therapy 3 times per week  Discharge Recommendations:  Equipment Will Continue to Assess for Equipment Needs. Post-acute therapy Discharge to a transitional care facility for continued skilled therapy services (depending on POC, whether pt transitions to hospice).     See \"Rehab Therapy-Acute\" Patient Summary Report for complete documentation.     Pt seen for OT tx. New dx of metastatic pulmonary adenocarcinoma and T5 cord compression. Pt completed: bed mobility, amb to bathroom, toileting for BM, up to chair at end of session. Pt demos increased confusion (still mild-mod), decreased standing balance. Appears to be feeling worse overall which negatively impacts activity tolerance. Difficult to anticipate potential to progress functionally based on dx. Pt may need increased assist on DC (he was independent PTA). Acute OT to continue following.   "

## 2018-02-02 NOTE — PROGRESS NOTES
Patient brought down to Radiation Therapy and received treatment 3 out of 5 for the upper t spine and treatment 4 of 4 for the lower t spine. Patient tolerated procedure well. Will continue treatment as planned.

## 2018-02-02 NOTE — PROGRESS NOTES
[facility] Hospitalist Progress Note    Date of Service: 2018    Chief Complaint  89 y.o. male admitted 2018 with metastatic lung cancer, PE on heparin, pericartitis with Pericardial effusion and dementia    Interval Problem Update  Patient transferred to oncology overnight, he is pleasantly confused but states he has no pain.  He remembers he is doing radiation daily because of cancer but doesn't know what else is going on and isn't able to answer questions with consistency.  Palliaitive care RN involved and has good rapport with daughter.  After patient completes radiation, she would like to take him home with her and have hospice assistance.  I've placed hospice order.      Consultants/Specialty  Oncology - Lynne  Rad-onc - Jennifer Smith - Crow    Disposition  Home with hospice post radiation.        Review of Systems   Unable to perform ROS: Mental status change      Physical Exam  Laboratory/Imaging   Hemodynamics  Temp (24hrs), Av.7 °C (98.1 °F), Min:36.2 °C (97.1 °F), Max:37.1 °C (98.8 °F)   Temperature: 36.2 °C (97.1 °F)  Pulse  Av.2  Min: 55  Max: 107   Blood Pressure : 118/56      Respiratory      Respiration: 16, Pulse Oximetry: 100 %     Work Of Breathing / Effort: Mild  RUL Breath Sounds: Diminished, RML Breath Sounds: Diminished, RLL Breath Sounds: Diminished, OBED Breath Sounds: Diminished, LLL Breath Sounds: Diminished    Fluids    Intake/Output Summary (Last 24 hours) at 18 2243  Last data filed at 18 2200   Gross per 24 hour   Intake             2583 ml   Output             1600 ml   Net              983 ml       Nutrition  Orders Placed This Encounter   Procedures   • DIET ORDER     Standing Status:   Standing     Number of Occurrences:   1     Order Specific Question:   Diet:     Answer:   Regular [1]     Physical Exam   Constitutional: He appears well-developed and well-nourished. No distress.   HENT:   Head: Normocephalic and atraumatic.   Eyes: Conjunctivae are  "normal. No scleral icterus.   Neck: Neck supple. No JVD present.   Cardiovascular: Normal rate and regular rhythm.  Exam reveals friction rub. Exam reveals no gallop.    No murmur heard.  Pulmonary/Chest: Effort normal and breath sounds normal. No respiratory distress. He has no wheezes. He exhibits no tenderness.   Abdominal: Soft. Bowel sounds are normal. He exhibits no distension and no mass. There is no tenderness.   Musculoskeletal: He exhibits no edema or tenderness.   Neurological: He is alert. No cranial nerve deficit.   Patient pleasantly confused, answers questions with \"I'm note sure what's going on or where I'm at\"   Skin: Skin is warm and dry. He is not diaphoretic. No erythema. No pallor.   Psychiatric: He has a normal mood and affect. His behavior is normal.   Nursing note and vitals reviewed.      Recent Labs      01/30/18   0254  01/31/18   0232  02/01/18   0417   WBC  9.6  22.0*  18.3*   RBC  4.52*  4.56*  4.75   HEMOGLOBIN  13.1*  13.3*  14.0   HEMATOCRIT  41.1*  41.2*  42.8   MCV  90.9  90.4  90.1   MCH  29.0  29.2  29.5   MCHC  31.9*  32.3*  32.7*   RDW  48.3  48.0  48.0   PLATELETCT  299  366  369   MPV  10.0  10.0  9.9     Recent Labs      01/30/18   0254  01/31/18   0232   SODIUM  139  137   POTASSIUM  4.3  4.3   CHLORIDE  106  107   CO2  22  20   GLUCOSE  132*  135*   BUN  25*  40*   CREATININE  0.95  0.88   CALCIUM  8.6  7.8*     Recent Labs      02/01/18   0417  02/01/18   1150  02/01/18   1814   APTT  101.8*  70.7*  77.3*                  Assessment/Plan     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 Right hepatic metastasis, gastrohepatic ligament LN, T2, L3 osteolytic lesions, Left and Right rib lesions and, multiple pulmonary nodules. Complicated by VTE. Discussed palliative care and hospice as options, recommended that they discuss it with oncology as well.  "   - palliative care consulted placed  - Radiation oncology on board  - Oncology consulted, greatly appreciate  - Dexamethasone for cord compression        Acute hypoxemic respiratory failure (CMS-HCC)- (present on admission)   Assessment & Plan    PNA, continue management of infectious concerns. Elevated BNP / Pleural effusions noted  - completed cefdinir  - monitor clinically  - RT to follow        Cord compression (CMS-[facility])- (present on admission)   Assessment & Plan    At T2, seen on MRI. Previously discussed with neurosurg, no intervention at that time.  - Radiation oncology on board  - radiation treatment 4/5 today T2, 2/5 T4/8 per radiation oncology notes  - continue dexamethasone for now  Continue goals of care conversations - daughter wants to take patient home with hospice          DVT (deep venous thrombosis) (CMS-HCC)- (present on admission)   Assessment & Plan    Acute, B/L LE. Likely 2/2 malignancy. Can be transitioned to oral regimen if decided that no procedures are to be taking place.  - continue heparin drip        Pleural effusion- (present on admission)   Assessment & Plan    B/L. Likely para pneumonic vs malignant. Currently small  - monitor clinically  - consider diuresis and repeat imaging        Pneumonia- (present on admission)   Assessment & Plan    Bilateral LL pneumonia. Symptomatically improving.  - s/p ceftriaxone and azithromycin x5 days  - omnicef to complete 7 day course  - RT to follow        Pericardial effusion- (present on admission)   Assessment & Plan    Noted on echocardiogram and CT. Complicated by pericarditis. Malignant etiologies cannot be ruled out  - cardiology has evaluated, greatly appreciate  - Continue colchicine x6 weeks  - Interval TTE in 2-4 weeks recommended        Thoracic aortic aneurysm without rupture (CMS-HCC)- (present on admission)   Assessment & Plan    Outpatient surveillance per PCP recommended and VS evaluation recommended        DNR (do not  resuscitate)- (present on admission)   Assessment & Plan    Discussed with patient 01/26/18  Patient wants to be DNR / DNI  Patient verbalized understanding of his code status  Updated in EMR to reflect patient wishes  Likely home with hospice after completion of palliative radiation.          Leukocytosis- (present on admission)   Assessment & Plan    Without sepsis. Resolved to 9.6 on 1/30 but with 90% neutrophils. Now with increase to 22. Stress response from radiation vs pneumonia? VSS  - monitor clinically        UTI (urinary tract infection)- (present on admission)   Assessment & Plan    This is 2/2 chronic urinary retention and related to ISC at home. Recent culture with E.Coli. Complicated UTI  - s/p 5 day course  - outpatient urology evaluation recommended        BPH (benign prostatic hyperplasia)- (present on admission)   Assessment & Plan    With urinary retention. H/o TURP  - tolerating increased flomax and added finasteride  - outpatient urology follow up  - encourage mobilization as able  - diaz        CKD (chronic kidney disease) stage 3, GFR 30-59 ml/min- (present on admission)   Assessment & Plan    Monitor renal function / Avoid nephrotoxins and dose medications renally            Reviewed items::  Labs reviewed, Medications reviewed and Radiology images reviewed  Diaz catheter::  Urinary Tract Retention or Urinary Tract Obstruction  DVT prophylaxis pharmacological::  Heparin

## 2018-02-02 NOTE — DISCHARGE PLANNING
Received Hospice order.  Referral sent to [facility] at 1003.  Copy of choice form has been faxed to [facility].

## 2018-02-02 NOTE — CARE PLAN
Problem: Safety  Goal: Will remain free from falls  Outcome: PROGRESSING AS EXPECTED  Pt is free from accidental injury. Fall precautions are in place. Treaded socks in use, bed alarm is in use, appropriate sign on the door, personal possessions and call light within reach, bed in low, and gait assessed. Up x2, near nursing station.     Problem: Pain Management  Goal: Pain level will decrease to patient's comfort goal  Outcome: PROGRESSING AS EXPECTED  Denies pain at this time, hourly rounding in place.     Problem: Respiratory:  Goal: Respiratory status will improve  Outcome: PROGRESSING AS EXPECTED  Pt is maintaining oxygenation on 4.5 L via NC.  in use. Pt's saturations are in the 90s, titrate as needed. Pt is on room air at home. HOB elevated for optimal ventilation. Hourly rounding in place.

## 2018-02-02 NOTE — PALLIATIVE CARE
PALLIATIVE CARE FOLLOW UP:  Updated Amie on conversation with patient, Dr. Hoover, and ZAYRA Holloway will await to hear from [facility] to begin working out details of discharge planning. Encouraged her to call with any questions, needs or concerns.     Thank you for allowing Palliative Care to follow this patient. Please contact us at  with any questions.

## 2018-02-02 NOTE — WOUND TEAM
[facility]  Inpatient Services  Initial Wound & Skin Care Evaluation    Admission Date:  1/10/18   Consult Date:    2/2/18  HPI, PMH, SH: Reviewed  Unit where seen by Wound Team: [id number]    WOUND CONSULT RELATED TO: Coccyx breakdown    SUBJECTIVE:  'I'll roll'    Self Report / Pain Level: 0/10    OBJECTIVE: Pt in bed, had a significant bowel movement present.  Pt able to roll to right side with min A and able to maintain position with CG A  WOUND TYPE, LOCATION, CHARACTERISTICS (Pressure ulcers: location, stage, POA or date identified)    Wound Type/Location:  Gluteal cleft/right buttock    Periwound:    Pink blanching, small area of purple on right buttock  Drainage:     none  Tissue Type and %:    Intact skin  Wound Edges:    closed  Odor:     none  Exposed structure(s):   none  S&S of Infection:    none  Measurements:   2/2/18      Gluteal cleft  Right buttock 2 spots  Length:    1.0cm    03 cm  Width:     0.3cm    0.3 cm  Depth:     PTW  Tracts/undermining:  none       INTERVENTIONS BY WOUND TEAM: Pt rolled to left, cleaned of stool, picture and measurements taken, barrier paste placed to cover entire gluteal, perineal, groin.    Dressing types: barrier paste.  Patient/family educated on rationale for plan of care_Asked pt if he could tell when he has to have a bowel movement and he shook his head yes.  Interdisciplinary Collaboration: RN, CNA    EVALUATION: Pt with some MAD and small amount of skin breakdown.  Purple discolored area not on a typical pressure point but will continue to watch for failure to progress.    Last Wilner Score by RN:  Factors affecting wound healing: Cognitive ability limits understanding of condition, immobility, incontinence   Goals: decrease area of skin loss by 10% in 2 weeks    NURSING PLAN OF CARE: (X)  Dressing changes: See Dressing Maintenance orders: X  Skin care: See Skin Care orders: X  Rectal tube care: See Rectal Tube Care orders:   Other orders:    RSKIN:  CURRENT (X) ORDERED (O)  Q shift Wilner:  X  Q shift pressure point assessments:  X  Atmosair        MICHAEL      Bariatric MICHAEL      Bariatric foam        Heel float boots       Heels floated on pillows    X  Barrier wipes    X  Barrier Cream      Barrier paste    X  Sacral silicone dressing      Silicone O2 tubing      Anchorfast      Trach with Optifoam split foam       Waffle mattress X     Rectal tube or BMS      Antifungal tx    Turn q 2 hours X  Up to chair    Ambulate   PT/OT     Dietician      PO     TF   TPN     PVN    NPO   # days   Other       WOUND TEAM PLAN OF CARE (X):   NPWT change 3 x week:        Dressing changes by wound team:       Follow up as needed:       Other (explain):    Anticipated discharge plans (X):  SNF:           Home Care:           Outpatient Wound Center:            Self Care:            Other:

## 2018-02-02 NOTE — PROGRESS NOTES
Received bedside report from day shift RN. Eliot x3, disoriented to event. Up x2. PIV patent, flushing. Heparin gtt per active order. Coude emily freemanianing to gravity. Patient denies pain, nausea, vomiting. Will continue to monitor. POC discussed with patient. Calls appropriately for assistance. Call light within reach. Bed in lowest and locked position. Bed alarm on. Q2 rounding in place.

## 2018-02-02 NOTE — PROGRESS NOTES
Assumed care at 0700.   Received bedside report from day RN.   Pt A/Ox2, not event or time. Pt denies pain.   Large bowel movement, states he can feel when he needs to go, but does not call to notify of BM.   Pt assessed, labs and notes reviewed. Medicated per MAR.   Heparin gtt continuously running.   POC discussed; pt agreeable to goals.    Pt board updated and pt informed of phone ext's, and hourly rounding.   Bed alarm is in use and functioning. Pt is up x2.   Pt needs are met at this time. Call light and phone within reach.

## 2018-02-02 NOTE — DIETARY
Nutrition Services: Update  Pt is on a regular diet and receiving Boost Plus TID with meals and magic cups BID as snacks. Pt reports he is not eating enough. Pt reports he sometimes consume the Boost and magic cups. Pt declined snacks. Per chart pt PO < %. Wt on 1/31 - 97.9 kg via bed scale, wt increased since admit, suspect related to scale error. Meds, labs, and ADLs reviewed.     PLAN/RECOMMEND -   1) Nutrition rep to see patient daily for meal and snack preferences.  2) Encourage PO  3) Weekly weights to monitor fluid and nutrition status  4) Please document PO intake for each meal as percentage of meals consumed    RD following

## 2018-02-02 NOTE — CARE PLAN
Problem: Communication  Goal: The ability to communicate needs accurately and effectively will improve  AxO x3, disoriented to event. Able to make needs known. Hard of hearing. Calls appropriately for assistance. Call light within reach.     Problem: Safety  Goal: Will remain free from injury  AxO x3, disoriented to event. Up x2. Bed in lowest and locked position. Calls appropriately for assistance. Call light within reach. Q2 rounding in place.

## 2018-02-03 NOTE — PROGRESS NOTES
Rec'd report & assumed care of pt at 0700. Assessment completed. Patient denies any pain at this time. Medication provided per MAR. Q4 neuro checks in place. Plan of care discussed & questions answered. Bed locked and in lowest position, call light within reach, non-skid socks in place, hourly rounding. Pt reports no further needs at this time.

## 2018-02-03 NOTE — CARE PLAN
Problem: Communication  Goal: The ability to communicate needs accurately and effectively will improve    Intervention: [place] patient and significant other/support system to call light to alert staff of needs  Remind pt to use the call light if he needs anything or feels the urge to have a bowel movement. Pt states that he will. Forgetful at times.       Problem: Safety  Goal: Will remain free from falls  Outcome: PROGRESSING AS EXPECTED  Pt is free from accidental injury. Fall precautions are in place. Treaded socks in use, bed alarm on, appropriate sign on the door, personal possessions and call light within reach, bed in low, and pt remains in bed, refusing to get up at this time.     Problem: Pain Management  Goal: Pain level will decrease to patient's comfort goal  Outcome: PROGRESSING AS EXPECTED  Denies pain at this time.    Problem: Skin Integrity  Goal: Risk for impaired skin integrity will decrease  Outcome: PROGRESSING SLOWER THAN EXPECTED  Offer Q2h turns, pt refusing. Remind pt to reposition often. Frequent checks for bowel movements and clean up, pt is unable to call before defecating and tends to sit in it without calling.

## 2018-02-03 NOTE — CARE PLAN
"Problem: Safety  Goal: Will remain free from injury  AxO x3, disoriented to event. Bed in lowest and locked position. Bed alarm on. Call light within reach. Patient unable to make needs known due to event disoriented. Q2 rounding in place.     Problem: Pain Management  Goal: Pain level will decrease to patient's comfort goal  Patient denies pain. States \"I am comfortable. Thank you.\" Offered Q2 turns, patient refused. Educated on importance. Patient verbalizing understanding. Continues to refuse. Will monitor.       "

## 2018-02-03 NOTE — PROGRESS NOTES
Received bedside report from day shift RN. Eliot x3, disoriented to event. Up x2. Patient offered Q2 turns, educated on skin integrity. Patient refused. Will continue to offer. PIV patent, flushing. Heparin gtt infusing per active order. No signs of bleeding. Q4 neuro checks in place per active order. Patient denies pain, nausea, vomiting. Will continue to monitor. POC discussed with patient. Call light within reach. Bed in lowest and locked position. Q2 rounding in place.

## 2018-02-03 NOTE — PROGRESS NOTES
[facility] Hospitalist Progress Note    Date of Service: 2018    Chief Complaint  89 y.o. male admitted 2018 with metastatic lung cancer, PE on heparin, pericartitis with Pericardial effusion and dementia    Interval Problem Update   Patient transferred to oncology overnight, he is pleasantly confused but states he has no pain.  He remembers he is doing radiation daily because of cancer but doesn't know what else is going on and isn't able to answer questions with consistency.  Palliaitive care RN involved and has good rapport with daughter.  After patient completes radiation, she would like to take him home with her and have hospice assistance.  I've placed hospice order.     Patient sleeping much of the day and during evaluation.  He is tolerating radiation but not able to tell me if he thinks it is helping his pain, he denies pain when asked.  Planning for home hospice underway, will complete radiation treatments on Tuesday.    Consultants/Specialty  Oncology - Lynne  Rad-onc - Pedria Smith - Crow    Disposition  Home with hospice post radiation.        Review of Systems   Unable to perform ROS: Mental status change      Physical Exam  Laboratory/Imaging   Hemodynamics  Temp (24hrs), Av.4 °C (97.5 °F), Min:36.2 °C (97.1 °F), Max:36.5 °C (97.7 °F)   Temperature: 36.4 °C (97.6 °F)  Pulse  Av.9  Min: 55  Max: 107   Blood Pressure : 107/56      Respiratory      Respiration: 20, Pulse Oximetry: 90 %     Work Of Breathing / Effort: Mild  RUL Breath Sounds: Diminished, RML Breath Sounds: Diminished, RLL Breath Sounds: Diminished, OBED Breath Sounds: Diminished, LLL Breath Sounds: Diminished    Fluids    Intake/Output Summary (Last 24 hours) at 18 1644  Last data filed at 18 0834   Gross per 24 hour   Intake             2880 ml   Output             1525 ml   Net             1355 ml       Nutrition  Orders Placed This Encounter   Procedures   • DIET ORDER     Standing Status:   Standing  "    Number of Occurrences:   1     Order Specific Question:   Diet:     Answer:   Regular [1]     Physical Exam   Constitutional: He appears well-developed and well-nourished. No distress.   HENT:   Head: Normocephalic and atraumatic.   Eyes: Conjunctivae are normal. No scleral icterus.   Neck: Neck supple. No JVD present.   Cardiovascular: Normal rate and regular rhythm.  Exam reveals friction rub. Exam reveals no gallop.    No murmur heard.  Pulmonary/Chest: Effort normal and breath sounds normal. No respiratory distress. He has no wheezes. He exhibits no tenderness.   Abdominal: Soft. Bowel sounds are normal. He exhibits no distension and no mass. There is no tenderness.   Musculoskeletal: He exhibits no edema or tenderness.   Neurological: He is alert. No cranial nerve deficit.   Patient pleasantly confused, answers questions with \"I'm note sure what's going on or where I'm at\"   Skin: Skin is warm and dry. He is not diaphoretic. No erythema. No pallor.   Psychiatric: He has a normal mood and affect. His behavior is normal.   Nursing note and vitals reviewed.      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 Right hepatic metastasis, gastrohepatic ligament LN, T2, L3 osteolytic lesions, Left and Right rib lesions and, multiple pulmonary nodules. " Complicated by VTE. Discussed palliative care and hospice as options, recommended that they discuss it with oncology as well.    - palliative radiation  - Palliative care RN  - Dexamethasone for cord compression until dc home        Acute hypoxemic respiratory failure (CMS-HCC)- (present on admission)   Assessment & Plan    PNA, continue management of infectious concerns. Elevated BNP / Pleural effusions noted  - completed cefdinir  - monitor clinically  - RT to follow        Cord compression (CMS-[facility])- (present on admission)   Assessment & Plan    At T2, seen on MRI. Previously discussed with neurosurg, no intervention at that time.  - Radiation oncology on board  - radiation treatment 4/5 today T2, 3/5 T4/8 per radiation oncology notes - will complete radiation on Tuesday  - continue dexamethasone for now  Continue goals of care conversations - daughter wants to take patient home with hospice          DVT (deep venous thrombosis) (CMS-[facility])- (present on admission)   Assessment & Plan    - continue heparin drip while in hospital - PTT stable        Pleural effusion- (present on admission)   Assessment & Plan    B/L. Likely para pneumonic vs malignant- small          Pneumonia- (present on admission)   Assessment & Plan    Completed treatment          Pericardial effusion- (present on admission)   Assessment & Plan    Noted on echocardiogram and CT.   - Continue colchicine x6 weeks          Thoracic aortic aneurysm without rupture (CMS-HCC)- (present on admission)   Assessment & Plan    Outpatient surveillance         DNR (do not resuscitate)- (present on admission)   Assessment & Plan    Was Discussed with patient and daughter on 01/26/18  Likely home with hospice after completion of palliative radiation.          Leukocytosis- (present on admission)   Assessment & Plan    Steroid induced, given transition to hospice post radiation, will not continue to check CBC          UTI (urinary tract infection)- (present on  admission)   Assessment & Plan    This is 2/2 chronic urinary retention and related to ISC at home. Recent culture with E.Coli. Complicated UTI  - s/p 5 day course          BPH (benign prostatic hyperplasia)- (present on admission)   Assessment & Plan    With urinary retention. H/o TURP  - tolerating increased flomax  diaz        CKD (chronic kidney disease) stage 3, GFR 30-59 ml/min- (present on admission)   Assessment & Plan     Avoid nephrotoxins and dose medications renally            Reviewed items::  Labs reviewed, Medications reviewed and Radiology images reviewed  Diaz catheter::  Urinary Tract Retention or Urinary Tract Obstruction  DVT prophylaxis pharmacological::  Heparin

## 2018-02-03 NOTE — PROGRESS NOTES
[facility] Hospitalist Progress Note    Date of Service: 2/3/2018    Chief Complaint  89 y.o. male admitted 1/25/2018 with metastatic lung cancer, PE on heparin, pericartitis with Pericardial effusion and dementia    Interval Problem Update  2/1 Patient transferred to oncology overnight, he is pleasantly confused but states he has no pain.  He remembers he is doing radiation daily because of cancer but doesn't know what else is going on and isn't able to answer questions with consistency.  Palliaitive care RN involved and has good rapport with daughter.  After patient completes radiation, she would like to take him home with her and have hospice assistance.  I've placed hospice order.    2/2 Patient sleeping much of the day and during evaluation.  He is tolerating radiation but not able to tell me if he thinks it is helping his pain, he denies pain when asked.  Planning for home hospice underway, will complete radiation treatments on Tuesday.  2/3 Patient states he feels tired today, many visitors this am.  He would like the lights turned out so he can nap.  No acute complaints.  Radiation to resume on Monday.    Consultants/Specialty  Oncology - Lynne  Rad-onc - Peddada  Sarah - Crow    Disposition  Home with hospice post radiation.        Review of Systems   Constitutional: Negative for chills and fever.   HENT: Negative for congestion.    Eyes: Negative for blurred vision and photophobia.   Respiratory: Negative for cough and shortness of breath.    Cardiovascular: Negative for chest pain, claudication and leg swelling.   Gastrointestinal: Negative for abdominal pain, constipation, diarrhea, heartburn and vomiting.   Genitourinary: Negative for dysuria and hematuria.   Musculoskeletal: Negative for joint pain and myalgias.   Skin: Negative for itching and rash.   Neurological: Negative for dizziness, sensory change, speech change, weakness and headaches.   Psychiatric/Behavioral: Negative for depression. The  patient is not nervous/anxious and does not have insomnia.       Physical Exam  Laboratory/Imaging   Hemodynamics  Temp (24hrs), Av.3 °C (97.3 °F), Min:36.1 °C (97 °F), Max:36.4 °C (97.6 °F)   Temperature: 36.4 °C (97.6 °F)  Pulse  Av.9  Min: 55  Max: 107   Blood Pressure : 122/65      Respiratory      Respiration: 20, Pulse Oximetry: 90 %     Work Of Breathing / Effort: Mild  RUL Breath Sounds: Clear, RML Breath Sounds: Diminished, RLL Breath Sounds: Diminished, OBED Breath Sounds: Clear, LLL Breath Sounds: Diminished    Fluids    Intake/Output Summary (Last 24 hours) at 18 1528  Last data filed at 18 0400   Gross per 24 hour   Intake              834 ml   Output              600 ml   Net              234 ml       Nutrition  Orders Placed This Encounter   Procedures   • DIET ORDER     Standing Status:   Standing     Number of Occurrences:   1     Order Specific Question:   Diet:     Answer:   Regular [1]     Physical Exam   Constitutional: He appears well-developed and well-nourished. No distress.   HENT:   Head: Normocephalic and atraumatic.   Eyes: Conjunctivae are normal. No scleral icterus.   Neck: Neck supple. No JVD present.   Cardiovascular: Normal rate and regular rhythm.  Exam reveals friction rub. Exam reveals no gallop.    No murmur heard.  Pulmonary/Chest: Effort normal and breath sounds normal. No respiratory distress. He has no wheezes. He exhibits no tenderness.   Abdominal: Soft. Bowel sounds are normal. He exhibits no distension and no mass. There is no tenderness.   Musculoskeletal: He exhibits no edema or tenderness.   Neurological: He is alert. No cranial nerve deficit.   Patient pleasantly confused, oriented to self.   Skin: Skin is warm and dry. He is not diaphoretic. No erythema. No pallor.   Psychiatric: He has a normal mood and affect. His behavior is normal.   Nursing note and vitals reviewed.      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 Right hepatic metastasis, gastrohepatic ligament LN, T2, L3 osteolytic lesions, Left and Right rib lesions and, multiple pulmonary nodules. Complicated by VTE. Discussed palliative care and hospice as options, recommended that they discuss it with oncology as well.    - palliative radiation  - Palliative care RN  - Dexamethasone for cord compression until dc home        Acute hypoxemic respiratory failure (CMS-HCC)- (present on admission)   Assessment & Plan    PNA, continue management of infectious concerns. Elevated BNP / Pleural effusions noted  - completed cefdinir  - monitor clinically  - RT to follow        Cord compression (CMS-HCC)- (present on admission)   Assessment & Plan    At T2, seen on MRI. Previously discussed with neurosurg, no intervention at that time.  - Radiation oncology on board  - radiation treatment completed 4/5 T2, 3/5 T4/8 per radiation oncology notes - will complete radiation on Tuesday  - continue dexamethasone for now  Continue goals of care conversations - daughter wants to take patient home with hospice          DVT (deep venous thrombosis) (CMS-HCC)- (present on admission)   Assessment & Plan    - continue heparin drip while in hospital - PTT stable        Pleural effusion- (present on admission)   Assessment & Plan    B/L. Likely para pneumonic vs malignant- small          Pneumonia- (present on admission)   Assessment & Plan    Completed treatment          Pericardial effusion- (present on admission)   Assessment & Plan    Noted on echocardiogram and CT.   - Continue  colchicine x6 weeks          Thoracic aortic aneurysm without rupture (CMS-HCC)- (present on admission)   Assessment & Plan    Outpatient surveillance         DNR (do not resuscitate)- (present on admission)   Assessment & Plan    Was Discussed with patient and daughter on 01/26/18  Likely home with hospice after completion of palliative radiation.          Leukocytosis- (present on admission)   Assessment & Plan    Steroid induced, given transition to hospice post radiation, will not continue to check CBC          UTI (urinary tract infection)- (present on admission)   Assessment & Plan    This is 2/2 chronic urinary retention and related to ISC at home. Recent culture with E.Coli. Complicated UTI  - s/p 5 day course          BPH (benign prostatic hyperplasia)- (present on admission)   Assessment & Plan    With urinary retention. H/o TURP  - tolerating increased flomax  diaz        CKD (chronic kidney disease) stage 3, GFR 30-59 ml/min- (present on admission)   Assessment & Plan     Avoid nephrotoxins and dose medications renally            Reviewed items::  Labs reviewed, Medications reviewed and Radiology images reviewed  Diaz catheter::  Urinary Tract Retention or Urinary Tract Obstruction  DVT prophylaxis pharmacological::  Heparin    [facility] Hospitalist Progress Note    Date of Service: 2/3/2018    Chief Complaint  89 y.o. male admitted 1/25/2018 with metastatic lung cancer, PE on heparin, pericartitis with Pericardial effusion and dementia    Interval Problem Update  2/1 Patient transferred to oncology overnight, he is pleasantly confused but states he has no pain.  He remembers he is doing radiation daily because of cancer but doesn't know what else is going on and isn't able to answer questions with consistency.  Palliaitive care RN involved and has good rapport with daughter.  After patient completes radiation, she would like to take him home with her and have hospice assistance.  I've placed hospice  order.    2/2 Patient sleeping much of the day and during evaluation.  He is tolerating radiation but not able to tell me if he thinks it is helping his pain, he denies pain when asked.  Planning for home hospice underway, will complete radiation treatments on Tuesday.    Consultants/Specialty  Oncology - Lynne  Rad-onc - Jennifer Smith - [facility]    Disposition  Home with hospice post radiation.        Review of Systems   Unable to perform ROS: Mental status change      Physical Exam  Laboratory/Imaging   Hemodynamics  Temp (24hrs), Av.3 °C (97.3 °F), Min:36.1 °C (97 °F), Max:36.4 °C (97.6 °F)   Temperature: 36.4 °C (97.6 °F)  Pulse  Av.9  Min: 55  Max: 107   Blood Pressure : 122/65      Respiratory      Respiration: 20, Pulse Oximetry: 90 %     Work Of Breathing / Effort: Mild  RUL Breath Sounds: Clear, RML Breath Sounds: Diminished, RLL Breath Sounds: Diminished, OBED Breath Sounds: Clear, LLL Breath Sounds: Diminished    Fluids    Intake/Output Summary (Last 24 hours) at 18 1528  Last data filed at 18 0400   Gross per 24 hour   Intake              834 ml   Output              600 ml   Net              234 ml       Nutrition  Orders Placed This Encounter   Procedures   • DIET ORDER     Standing Status:   Standing     Number of Occurrences:   1     Order Specific Question:   Diet:     Answer:   Regular [1]     Physical Exam   Constitutional: He appears well-developed and well-nourished. No distress.   HENT:   Head: Normocephalic and atraumatic.   Eyes: Conjunctivae are normal. No scleral icterus.   Neck: Neck supple. No JVD present.   Cardiovascular: Normal rate and regular rhythm.  Exam reveals friction rub. Exam reveals no gallop.    No murmur heard.  Pulmonary/Chest: Effort normal and breath sounds normal. No respiratory distress. He has no wheezes. He exhibits no tenderness.   Abdominal: Soft. Bowel sounds are normal. He exhibits no distension and no mass. There is no tenderness.  "  Musculoskeletal: He exhibits no edema or tenderness.   Neurological: He is alert. No cranial nerve deficit.   Patient pleasantly confused, answers questions with \"I'm note sure what's going on or where I'm at\"   Skin: Skin is warm and dry. He is not diaphoretic. No erythema. No pallor.   Psychiatric: He has a normal mood and affect. His behavior is normal.   Nursing note and vitals reviewed.      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 Right hepatic metastasis, gastrohepatic ligament LN, T2, L3 osteolytic lesions, Left and Right rib lesions and, multiple pulmonary nodules. Complicated by VTE. Discussed palliative care and hospice as options, recommended that they discuss it with oncology as well.    - palliative radiation  - Palliative care RN  - Dexamethasone for cord compression until dc home        Acute hypoxemic respiratory failure (CMS-HCC)- (present on admission)   Assessment & Plan    PNA, continue management of infectious concerns. Elevated BNP / Pleural effusions noted  - completed cefdinir  - monitor clinically  - RT to follow        Cord compression (CMS-HCC)- (present on admission)   Assessment & Plan    At T2, seen on MRI. Previously discussed with neurosurg, no intervention at that time.  - Radiation oncology on board  - radiation treatment completed 4/5 T2, 3/5 T4/8 per radiation oncology notes - will complete radiation on Tuesday  - continue dexamethasone for now  Continue goals of care conversations - daughter wants to take patient home with hospice        "   DVT (deep venous thrombosis) (CMS-HCC)- (present on admission)   Assessment & Plan    - continue heparin drip while in hospital - PTT stable        Pleural effusion- (present on admission)   Assessment & Plan    B/L. Likely para pneumonic vs malignant- small          Pneumonia- (present on admission)   Assessment & Plan    Completed treatment          Pericardial effusion- (present on admission)   Assessment & Plan    Noted on echocardiogram and CT.   - Continue colchicine x6 weeks          Thoracic aortic aneurysm without rupture (CMS-HCC)- (present on admission)   Assessment & Plan    Outpatient surveillance         DNR (do not resuscitate)- (present on admission)   Assessment & Plan    Was Discussed with patient and daughter on 01/26/18  Likely home with hospice after completion of palliative radiation.          Leukocytosis- (present on admission)   Assessment & Plan    Steroid induced, given transition to hospice post radiation, will not continue to check CBC          UTI (urinary tract infection)- (present on admission)   Assessment & Plan    This is 2/2 chronic urinary retention and related to ISC at home. Recent culture with E.Coli. Complicated UTI  - s/p 5 day course          BPH (benign prostatic hyperplasia)- (present on admission)   Assessment & Plan    With urinary retention. H/o TURP  - tolerating increased flomax  diaz        CKD (chronic kidney disease) stage 3, GFR 30-59 ml/min- (present on admission)   Assessment & Plan     Avoid nephrotoxins and dose medications renally            Reviewed items::  Labs reviewed, Medications reviewed and Radiology images reviewed  Diaz catheter::  Urinary Tract Retention or Urinary Tract Obstruction  DVT prophylaxis pharmacological::  Heparin

## 2018-02-03 NOTE — HOSPICE
Had conversation with Daughter Amie, Went by pt's room and he was sleeping. Spoke with RN and pt is on 4.5 L o2 she was going to see if pt needed 02 at this rate. Amie will be available to receive DME equipment on Monday between 3-5 pm. Plan per Dr. Hoover to possibly D/C Tue afternoon after last Radiation treatment. NIDA Avendaño informed. Evelin ALVA from [facility] called Amie to give her information about Personal Care services. She will need assistance so she can continue to work.

## 2018-02-04 NOTE — PROGRESS NOTES
Kateryna from Lab called with critical result of APTT of 114.6 at 1820. Critical lab result read back to Kateryna.   This critical lab result is within parameters established by Dr. Hoover for this patient. Small dose rate change of 200 u/hr, bringing dose to 1250 u/hr

## 2018-02-04 NOTE — PROGRESS NOTES
Received bedside report from day shift RN. Eliot x3, disoriented to event. Up x2. Staff assisting patient with turns appropriately. PIV patent, flushing. Heparin gtt infusing per active order. No signs of bleeding. Q4 neuro checks in place per active order. Patient denies pain, nausea, vomiting. Will continue to monitor. POC discussed with patient. Calls appropriately for assistance. Call light within reach. Bed in lowest and locked position. Q2 rounding in place.

## 2018-02-04 NOTE — PROGRESS NOTES
Rec'd report & assumed care of pt at 0700. Assessment completed. Pt is A&Ox3, disoriented to event. Pt denies any pain, nausea, vomiting at this time. Medications provided per MAR. Pt up x2. Replaced 16 [place] coude diaz with 18 [place] coude diaz, urine output 300mL clear yellow and continuing to drain. Plan of care discussed & questions answered. Bed locked and in lowest position, call light within reach, non-skid socks in place, hourly rounding. Pt reports no further needs at this time.

## 2018-02-04 NOTE — CARE PLAN
"Problem: Communication  Goal: The ability to communicate needs accurately and effectively will improve  AxO x3, disoriented to event. Patient using call light appropriately to make needs known today. Call light within reach. Hard of hearing. Q2 rounding in place.     Problem: Fluid Volume:  Goal: Will maintain balanced intake and output  Patient putting out minimal output in diaz. Declines intake of water, juices. Educated patients on importance of remaining hydrated. Patient continues to refuse. States \"I will only drink a sip with my pills.\" Will continue to educate as appropriately. Fluids at bedside, offering throughout shift.       "

## 2018-02-04 NOTE — CARE PLAN
Problem: Safety  Goal: Will remain free from falls  Outcome: PROGRESSING AS EXPECTED  Pt is free from accidental injury. Fall precautions are in place. Treaded socks in use, bed alarm in place, appropriate sign on the door, personal possessions and call light within reach, bed in low, pt refusing to get up, not able to assess ambulation. Calls appropriately.    Problem: Knowledge Deficit  Goal: Knowledge of disease process/condition, treatment plan, diagnostic tests, and medications will improve  Outcome: PROGRESSING SLOWER THAN EXPECTED  Confused at times, repeat questions. Reorienting and reassurance needed.     Problem: Pain Management  Goal: Pain level will decrease to patient's comfort goal  Outcome: PROGRESSING AS EXPECTED  Some pain felt with urine retention when the diaz was leaking and not draining properly. When the diaz was removed, pt stated that he felt much better and denied pain. Get new diaz in place with a larger circumference to prevent leakage.

## 2018-02-04 NOTE — PROGRESS NOTES
Pt states that he does not want to eat because it is causing him to have too many bowel movements.\  Educated pt regarding the importance of eating and continuing to have bowel movements.   Reassured the pt that if he has a bowel movement that we would clean him up and if he wants to try to go in the bed pan he can call us before hand.   He said okay, but he forgets.   Had this discussion with pt multiple times, but he is forgetful.   Encouraging pt to eat.

## 2018-02-04 NOTE — PROGRESS NOTES
Patient feeling urge to urinate. Per JAVI Zhong, patient urinating on rin pads. Notified MD Ortega, per Shannon okay for RN to place coude if protocol allows, okay to place consult to urology otherwise. Notified Charge Carrie, Charge notified NAM. Per NAM, okay to place as long as hospitalist is aware.

## 2018-02-05 NOTE — CARE PLAN
Problem: Safety  Goal: Will remain free from injury  Hourly rounding in effect, pt instructed to call for assistance, bed locked and in lowest position. Bed alarm on. Pt calls appropriately for assistance.        Problem: Knowledge Deficit  Goal: Knowledge of disease process/condition, treatment plan, diagnostic tests, and medications will improve  Pt updated and educated on nursing interventions, medications and POC      Problem: Skin Integrity  Goal: Risk for impaired skin integrity will decrease  Barrier cream and wipes in use. Q2 hour turns in place.

## 2018-02-05 NOTE — PROGRESS NOTES
"Pt A&Ox2, disoriented to situation and time. VS: /53   Pulse 64   Temp 36.4 °C (97.5 °F)   Resp 18   Ht 1.981 m (6' 6\")   Wt 97.9 kg (215 lb 13.3 oz)   SpO2 92%   BMI 24.94 kg/m² . Pt denies pain, n/v, numbness, tingling, SOB and chest pain. Pt had one episode of incontinent stool this morning, DTI and open wound on coccyx found during assessment. Dee to gravity. Pt needs met at this time, call light within reach, hourly rounding in effect, and will continue to monitor.       "

## 2018-02-05 NOTE — CARE PLAN
Problem: Knowledge Deficit  Goal: Knowledge of disease process/condition, treatment plan, diagnostic tests, and medications will improve    Intervention: Assess knowledge level of disease process/condition, treatment plan, diagnostic tests, and medications  Pt requires reinforcement of plan of care.

## 2018-02-05 NOTE — PROGRESS NOTES
Pt remained stable during the night. Continues to be forgetful at times but very pleasant. Dee in place with continued drainage. Hourly rounding in place. Bed lowered, locked and call light within reach.

## 2018-02-05 NOTE — CARE PLAN
Problem: Safety  Goal: Will remain free from injury    Intervention: Provide assistance with mobility   Educated pt on importance of using call light for all needs and assistance. Pt forgetful and requires reminding.

## 2018-02-06 PROBLEM — N39.0 UTI (URINARY TRACT INFECTION): Status: RESOLVED | Noted: 2018-01-01 | Resolved: 2018-01-01

## 2018-02-06 PROBLEM — J18.9 PNEUMONIA: Status: RESOLVED | Noted: 2018-01-01 | Resolved: 2018-01-01

## 2018-02-06 PROBLEM — D72.829 LEUKOCYTOSIS: Status: RESOLVED | Noted: 2018-01-01 | Resolved: 2018-01-01

## 2018-02-06 PROBLEM — J96.01 ACUTE HYPOXEMIC RESPIRATORY FAILURE (HCC): Status: RESOLVED | Noted: 2018-01-01 | Resolved: 2018-01-01

## 2018-02-06 NOTE — DISCHARGE SUMMARY
"CHIEF COMPLAINT ON ADMISSION  Chief Complaint   Patient presents with   • Sent by MD     PCP for further testing. recent UTI and treatment   • Fall     fall 2 days ago, hit head, no LOC, no blood thinners. pt c/o pain to left hip with lifting left leg, \"pain all over\" when lifting leg   • Other     loss of appetite and difficulty walking after fall.   • Shortness of Breath     pain in ribs       CODE STATUS  DNR    HPI & HOSPITAL COURSE  Please see original H&P and consult notes for specific information, patient was admitted due to possible CAP, UTI and fall, he was started on iv atb, xray chest showed lytic lesion on rib patient went for CT chest that showed b/l consolidation lower lobes and multiple b/l lymph nodes, also showed pericardial effusion cardiologist was consulted due to chest pain and elevated troponin likely pericarditis patient was started on colchicine, patient had DVT on left lower ext, is thought to have PE too, he was started on heparin drip, patient had CT abdomen that showed liver mets, with multiple lymph nodes, along with osteolytic mets to l3, T2, patient went for bone biopsy, radio oncology consulted and medical oncology consulted, patient was started on decadron and plan to start palliative radiotherapy was made patient had MRI showing cord compression at T5, biopsy showed possible lung adenocarcinoma oncology recommended hospice and palliative care was consulted, patient completed his radiotherapy patient and family agreed with hospice care, patient to be d/c home with hospice care, patient today is feeling ok denies any pain, he is tolerating food, discussed with patient's daughter regarding poor prognosis and hospice care, patient will be d/c home today under hospice care, patient and family expressed understanding of his d/c plan and agreed with it. No requiring pain meds, decadrone for 15 days, hospice to assess the need to continue on decadron.     The patient met 2-midnight criteria " for an inpatient stay at the time of discharge.    Therefore, he is discharged in guarded and stable condition with close outpatient follow-up.    SPECIFIC OUTPATIENT FOLLOW-UP  Hospice care.     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[facility]) POA: Yes    Pleural effusion POA: Yes    DVT (deep venous thrombosis) (CMS-HCC) POA: Yes    Cord compression (CMS-HCC) POA: Yes    CKD (chronic kidney disease) stage 3, GFR 30-59 ml/min POA: Yes      Overview: GFR 41 Jan/18    BPH (benign prostatic hyperplasia) POA: Yes    DNR (do not resuscitate) POA: Yes    Thoracic aortic aneurysm without rupture (CMS-HCC) POA: Yes    Pericardial effusion POA: Yes    Retention of urine POA: Yes      Overview: ICD-10 transition  Resolved Problems:    Acute hypoxemic respiratory failure (CMS-[facility]) POA: Yes    Pneumonia POA: Yes    UTI (urinary tract infection) POA: Yes    Leukocytosis POA: Yes      FOLLOW UP  No future appointments.  No follow-up provider specified.    MEDICATIONS ON DISCHARGE   Mikey Kelly   Home Medication Instructions JODIE:[id number]    Printed on:02/06/18 5078   Medication Information                      acetaminophen (TYLENOL) 325 MG Tab  Take 2 Tabs by mouth every 6 hours as needed (Mild Pain; (Pain scale 1-3); Temp greater than 100.5 F).             colchicine (COLCRYS) 0.6 MG Tab  Take 1 Tab by mouth 2 Times a Day.             dexamethasone (DECADRON) 6 MG Tab  Take 1 Tab by mouth every 8 hours.             finasteride (PROSCAR) 5 MG Tab  Take 1 Tab by mouth every day.             ipratropium-albuterol (DUONEB) 0.5-2.5 (3) MG/3ML nebulizer solution  3 mL by Nebulization route 4 times a day.             tamsulosin (FLOMAX) 0.4 MG capsule  Take 2 Caps by mouth every bedtime.                 DIET  Orders Placed This Encounter   Procedures   • DIET ORDER     Standing Status:   Standing     Number of Occurrences:   1     Order Specific Question:   Diet:     Answer:   Regular [1]        ACTIVITY  As tolerated.  Weight bearing as tolerated      CONSULTATIONS  Oncology  Cardiology   Radio-oncology.     PROCEDURES  biopsy    LABORATORY  Lab Results   Component Value Date/Time    SODIUM 136 02/04/2018 12:29 AM    POTASSIUM 4.3 02/04/2018 12:29 AM    CHLORIDE 110 02/04/2018 12:29 AM    CO2 20 02/04/2018 12:29 AM    GLUCOSE 124 (H) 02/04/2018 12:29 AM    BUN 37 (H) 02/04/2018 12:29 AM    CREATININE 0.86 02/04/2018 12:29 AM    CREATININE 1.2 09/09/2008 09:51 AM        Lab Results   Component Value Date/Time    WBC 16.6 (H) 02/04/2018 12:29 AM    HEMOGLOBIN 15.3 02/04/2018 12:29 AM    HEMATOCRIT 45.4 02/04/2018 12:29 AM    PLATELETCT 285 02/04/2018 12:29 AM        Total time of the discharge process exceeds 38 minutes

## 2018-02-06 NOTE — PROGRESS NOTES
Spoke with Dr. Cummings regarding pt anticipated discharge at 1300 with [facility] transporting. Dr. Cummings agrees with discharge plan and states he will place orders accordingly.

## 2018-02-06 NOTE — DISCHARGE PLANNING
Phone contact with pt's daughter Amie ([phone]) who confirmed plan for pt to be discharged to her home with hospice.  She requested an afternoon transfer if possible.  JANETTE reviewed page 2 of IMM and provided education r/t pt's Medicare rights/discharge appeals process.    JANETTE faxed [facility] and internal transport form requesting 1300 transfer.  Awaiting confirmation.

## 2018-02-06 NOTE — PROGRESS NOTES
Assumed care of the patient at 1915. Received report from the day shift RN. Pt is AOx2 (disoriented to time and situation). Pt and family have been updated on POC, all questions answered at this time. Pt had PIV w/ cont. Heparin infusion. Dee to gravity. Pt incontinent of stool. Q 2 turns implemented. O2 via nasal cannula. Fall precautions implemented. Call light within reach, hourly rounding in place.

## 2018-02-06 NOTE — DISCHARGE PLANNING
SW notified daughter Amie of 1300 transfer via [facility], she is going to come to hospital at 1230 to sign paperwork and assist with transfer process.  SW notified bedside RN of 1300 transfer, discharge orders are still needed.  SW and bedside RN will attempt to reach hospitalist for d/c orders.  Notified hospice RN Zoe of transfer time.

## 2018-02-06 NOTE — PROGRESS NOTES
Palliative Progress Note               Author: Ghislaine Sanchez Date & Time created: 2018  9:36 AM     Interval History:  89 yo M with new diagnosis of adenocarcinoma likely lung primary with metastasis to Liver, gastrohepatic ligament LN, T2, L3 osteolytic lesions, Left and Right rib lesions and, multiple pulmonary nodules. Patient also with VTE. He is post XRT to T2., T4/8? Also treated for PNA, UTI, and noted increased BNP(350), PCM (Alb 2.7) with anorexia/cachexia.  MRI T spine  Multilevel osseous metastatic disease with lesions most prominent at T2, T5, T7, T8, T9  T5 pathologic collapse with ventral epidural mass effect as well as a peripherally enhancing dorsal epidural collection resulting in severe central stenosis at the T5 level. The dorsal epidural collection is most consistent with heterogeneously   enhancing epidural soft tissue tumor. Superimposed epidural phlegmon/epidural abscess is thought less likely but is not entirely excluded from the imaging appearance.  Review of Systems:  Positive for delirium. Positive for dyspnea. Positive for pain. Positive for anorexia.   The patient denies pain and SOB but subjectively appears to have both. His memory is also declining but no further studies wishes by patient or his daughter.       Physical Exam:    Vitals:  Temp (24hrs), Av.3 °C (97.3 °F), Min:36.1 °C (97 °F), Max:36.4 °C (97.5 °F)  Temperature: 36.3 °C (97.4 °F)  Pulse  Av.4  Min: 45  Max: 107  Blood Pressure : 115/51       Physical Exam   Constitutional:   Thin, cachectic appearing   Cardiovascular:   Irregular/bradycardia. 56 BPM   Pulmonary/Chest: He is in respiratory distress. He has wheezes. He has rales.   Abdominal: Soft. Bowel sounds are normal.   scaphoid   Musculoskeletal: He exhibits no edema.         Recent Labs      18   0029   SODIUM  136   POTASSIUM  4.3   CHLORIDE  110   CO2  20   GLUCOSE  124*   BUN  37*   CREATININE  0.86   CALCIUM  7.5*     Recent Labs       02/04/18   0029   WBC  16.6*   RBC  5.10   HEMOGLOBIN  15.3   HEMATOCRIT  45.4   MCV  89.0   MCH  30.0   MCHC  33.7   RDW  46.8   PLATELETCT  285   MPV  10.2       NM-BONE SCAN WHOLE BODY   Final Result      Thoracic and lumbar spine activity consistent with metastatic disease.   Right eighth and left ninth rib activity consistent with metastatic disease.      MR-THORACIC SPINE-WITH & W/O   Final Result      1.  Multilevel osseous metastatic disease with lesions most prominent at T2, T5, T7, T8, T9.   2.  T5 pathologic collapse with ventral epidural mass effect as well as a peripherally enhancing dorsal epidural collection resulting in severe central stenosis at the T5 level. The dorsal epidural collection is most consistent with heterogeneously    enhancing epidural soft tissue tumor. Superimposed epidural phlegmon/epidural abscess is thought less likely but is not entirely excluded from the imaging appearance.   3.  Mild central stenosis at T7 and T8 levels.   4. The case was discussed (call report) with DR. JHAVERI at 9:48 AM 1/30/2018.      MR-CERVICAL SPINE-WITH & W/O   Final Result      1.  No evidence of osseous metastatic disease, cord impingement, or central spinal stenosis in the cervical spine.   2.  Degenerative disc disease and spondylotic changes as detailed for each level above in the body of report.   3.  No myelopathic cord signal abnormality at any cervical level.   4.  Cervical and left-sided supraclavicular adenopathy again noted concordant with CT soft tissue neck findings.      CT-CHEST (THORAX) W/O   Final Result   Addendum 1 of 1   ADDENDUM: Upon reviewing the bone windows of the CT scan of the thorax    dated 1/26/2018. There are multiple varying sized lytic destructive    lesions involving the T2, T5, T7, T8, T9 and, and T10 vertebral bodies.    Further there is distraction of the    posterior walls of all of these vertebral bodies except for T10 which is    worrisome for cord compression  especially at the T7-T9 levels. There is    also mild pathologic compression fracture at the T5 level.            1.  Diffuse metastatic metastatic disease throughout the thoracic spine    with a pathologic compression fracture at the T5 level.      2. Metastatic disease extending through the posterior walls of the T7-T9    vertebral bodies suspicious for cord compression at these levels.      1.  An Emergent Document Only message has been documented for ANDREA CHUN    in the [employer]  Critical Result system on 1/29/2018 12:24 PM,    Message ID [id number].      Final         1.  Bilateral dependent pulmonary consolidations with small bilateral pleural effusions, right greater than left.   2.  Multiple bilateral pulmonary nodules, radiographic follow-up in 3 months with repeat CT chest as clinically appropriate. Correlate with history.   3.  4.9 cm ascending thoracic aortic aneurysm, radiographic follow-up and surveillance as clinically appropriate.   4.  Matted mediastinal and neck enlarged lymph nodes, workup and evaluation for causes of adenopathy as clinically appropriate   5.  Cardiomegaly.   6.  Moderate pericardial effusion   7.  Atherosclerosis and atherosclerotic coronary artery disease            MR-LUMBAR SPINE-W/O   Final Result   Addendum 1 of 1   Addendum:   Incomplete thoracic MRI study. Single sagittal T2-weighted sequences were    reviewed. It demonstrates multiple metastatic bone lesions in the    midthoracic spine. There is abnormal soft tissue along the dorsal spinal    cord from the levels of T4-T8 causing    severe canal compromise.      Final      1.  Multiple T2 hyperintense lesions in the L3, L4 and T12 vertebral bodies likely representing metastasis. There is no evidence of mass in the lumbar spinal canal causing canal compromise.   2.  Degenerative disease as described above.   3.  Multiple metastatic lesions in the thoracic spine with possible canal compromise. Please see MR thoracic  spine report for further details.   4.  Focal an approximately 2.1 cm sized lesion in the caudate lobe of the liver likely representing metastasis.      IR-NEEDLE BX-DEEP BONE(CT)   Final Result         1.  Successful bilateral transpedicular core bone biopsies of the L3 vertebral body.      LE VENOUS DUPLEX - DVT ([facility])   Final Result      Carotid Duplex ([facility])   Final Result      CT-ABDOMEN-PELVIS WITH   Final Result      Right hepatic metastasis      Borderline gastrohepatic ligament lymph node could be reactive or malignant      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 Additional lucencies are suspicious as well. Recommend bone scan to further analyze (if PET/CT is not performed)      CT-SOFT TISSUE NECK WITH   Final Result      1.  T2 osteolytic metastasis causes mild central canal encroachment but no pathologic fracture is seen      2.  Level 3 cervical lymphadenopathy bilaterally. No enlarged nodes above this      3.  Mild atherosclerosis with no significant stenosis seen      Echocardiogram Comp W/O Cont   Final Result      [id number] (W/O CXR)   Final Result         1.  Bilateral third rib lytic changes, concerning for lytic lesion. Further evaluation with CT of the chest for further characterization.   2.  No acute rib fracture identified.   3.  Pulmonary edema and/or infiltrates.   4.  Atherosclerosis   5.  Cardiomegaly      DX-HIP-UNILATERAL-WITH PELVIS-1 VIEW LEFT   Final Result         1.  Degenerative changes of the spine.   2.  No acute traumatic bony injury identified      US-RENAL   Final Result         1.  Nonvisualization of the left ureteral jet, otherwise unremarkable renal ultrasound.      CT-HEAD W/O   Final Result      1.  No acute abnormality.   2.  Mild cerebral atrophy.   3.  Mild chronic microvascular ischemic disease.      DX-CHEST-LIMITED (1 VIEW)   Final Result         Diffuse interstitial prominence could relate to mild edema or  viral infection.      Patchy airspace opacities in the bilateral lower lobes, left more than right, atelectasis versus consolidation.          Medications:      Current Facility-Administered Medications:   •  ipratropium-albuterol (DUONEB) nebulizer solution 3 mL, 3 mL, Nebulization, Q4H PRN (RT), Markus Boyer M.D.  •  dexamethasone (DECADRON) injection 6 mg, 6 mg, Intravenous, Q6HRS, Hedy ROSS M.D., 6 mg at 02/06/18 0713  •  morphine (pf) 4 mg/ml injection 2-4 mg, 2-4 mg, Intravenous, Q4HRS PRN, Desire Guzman M.D., 2 mg at 01/29/18 2314  •  [COMPLETED] heparin injection 6,000 Units, 6,000 Units, Intravenous, Once, 6,000 Units at 01/28/18 1437 **AND** heparin injection 3,200 Units, 3,200 Units, Intravenous, PRN, 3,200 Units at 01/31/18 2222 **AND** heparin infusion 25,000 units in 500 ml 0.45% nacl, , Intravenous, Continuous, Last Rate: 21 mL/hr at 02/06/18 0709, 1,050 Units/hr at 02/06/18 0709 **AND** Action is required: Protocol 440 Heparin Weight Based has been implemented, , , Once **AND** Protocol 440 Heparin Weight Based DO NOT GIVE ANY HEPARIN BOLUS TO STROKE PATIENT, , , CONTINUOUS **AND** Protocol 440 Heparin Weight Based Discontinue Enoxaparin (Lovenox), Dabigatran (Pradaxa), Rivaroxaban (Xarelto), Apixaban (Eliquis), Edoxaban (Savaysa, Lixiana), Fondaparinux (Arixtra) and Argatroban prior to heparin administration, , , CONTINUOUS **AND** Protocol 440 Heparin Weight Based Draw baseline aPTT, PT, and platelet count if not already done, , , CONTINUOUS **AND** Protocol 440 Heparin Weight Based Draw aPTT 6 hours after beginning infusion. , , , CONTINUOUS **AND** Protocol 440 Heparin Weight Based Draw Platelet count every three days. Contact MD if platelet is 50% lower than baseline count., , , CONTINUOUS **AND** Protocol 440 Heparin Weight Based Record Patient Data, , , CONTINUOUS **AND** Protocol 440 Heparin Weight Based INSTRUCTIONS, , , CONTINUOUS **AND** Protocol 440 Heparin Weight Based  Review aPTT results 6 hours after infusion is begun as detailed, , , CONTINUOUS **AND** Protocol 440 Heparin Weight Based Adjust heparin to maintain aPTT between 55-96 sec, , , CONTINUOUS **AND** Protocol 440 Heparin Weight Based Order aPTT 6 hours after any rate change or hold until aPTT is therapeutic (55-96 seconds), , , CONTINUOUS **AND** Protocol 440 Heparin Weight Based Documentation and verification, , , CONTINUOUS, Desire Guzman M.D.  •  colchicine (COLCRYS) tablet 0.6 mg, 0.6 mg, Oral, BID, Desire Guzman M.D., 0.6 mg at 02/06/18 0848  •  tamsulosin (FLOMAX) capsule 0.8 mg, 0.8 mg, Oral, QHS, Desire Guzman M.D., 0.8 mg at 02/05/18 2234  •  finasteride (PROSCAR) tablet 5 mg, 5 mg, Oral, QHS, Desire Guzman M.D., 5 mg at 02/05/18 2234  •  Pharmacy Consult Request 1 Each, 1 Each, Other, PRN, Markus Boyer M.D.  •  Respiratory Care per Protocol, , Nebulization, Continuous RT, Markus Boyer M.D.  •  acetaminophen (TYLENOL) tablet 650 mg, 650 mg, Oral, Q6HRS PRN, Markus Boyer M.D., 650 mg at 01/27/18 1722  •  ondansetron (ZOFRAN) syringe/vial injection 4 mg, 4 mg, Intravenous, Q4HRS PRN, Markus Boyer M.D.  •  ondansetron (ZOFRAN ODT) dispertab 4 mg, 4 mg, Oral, Q4HRS PRN, Markus Boyer M.D.    Problem List:  1. Adenocarcinoma of the lung with metastases to multiple pulmonary nodules spine. ribs and liver  2. DVT not on anticoagulation  3. Compression T5 malignant - post XRT to several vertebra  4. Severe PCM with anorexia/cachexia  5. Memory decline since admission - no further diagnostics.    Assesment/Plan:  89 yo M with hospice admitting diagnosis of adenocarcinoma of the lung with metastases to multiple pulmonary nodules spine. ribs and liver. Patient to go home with [facility] today. DaughterAmie is caregiver.   1. Decrease dexamethasone to 6 mg PO BID  2. Can use morphine soln 20 mg/ml 5 mg q 2 h PRN     Code Status:DNR    ACP/GOC:Will complete POLST with daughter later  today.    Total visit time 35 minutes with 50% of the time spent with counseling and case coordination.    <PALLIATIVEREVIEW>    Ghislaine Sanchez M.D.  Palliative Care Physician  2/6/2018 9:36 AM

## 2018-02-06 NOTE — CARE PLAN
Problem: Safety  Goal: Will remain free from falls    Intervention: Implement fall precautions  Bed alarm initiated, fall risk wristband in place, treaded socks, bed locked and in low position, bed pan offered, diaz in place, call light within reach, hourly rounding implemented.      Problem: Skin Integrity  Goal: Risk for impaired skin integrity will decrease    Intervention: Implement precautions to protect skin integrity in collaboration with the interdisciplinary team  Q 2 hour turns, waffle overlay, rin-care provided PRN, scheduled toileting offered, moisturizer and barrier bream in use, pt encouraged to maintain adequate oral intake.

## 2018-02-06 NOTE — PROGRESS NOTES
[facility] Hospitalist Progress Note    Date of Service: 2/5/2018    Chief Complaint  89 y.o. male admitted 1/25/2018 with metastatic lung cancer, PE on heparin, pericartitis with Pericardial effusion and dementia    Interval Problem Update  2/1 Patient transferred to oncology overnight, he is pleasantly confused but states he has no pain.  He remembers he is doing radiation daily because of cancer but doesn't know what else is going on and isn't able to answer questions with consistency.  Palliaitive care RN involved and has good rapport with daughter.  After patient completes radiation, she would like to take him home with her and have hospice assistance.  I've placed hospice order.    2/2 Patient sleeping much of the day and during evaluation.  He is tolerating radiation but not able to tell me if he thinks it is helping his pain, he denies pain when asked.  Planning for home hospice underway, will complete radiation treatments on Tuesday.  2/3 Patient states he feels tired today, many visitors this am.  He would like the lights turned out so he can nap.  No acute complaints.  Radiation to resume on Monday.  2/4 Patient with family visiting again and in good spirits.  He had urinary retention overnight and required coude placement to help alleviate.  He states he feels much better when evaluated.  2/5 Patient states he feels okay, radiation resumed again today and will complete tomorrow.  Anticipate dc home with daughter tomorrow with hospice    Consultants/Specialty  Oncology - Lynne  Rad-onc - Peddamaribel Smith - [facility]    Disposition  Home with hospice post radiation.        Review of Systems   Constitutional: Negative for chills and fever.   HENT: Negative for congestion.    Eyes: Negative for blurred vision and photophobia.   Respiratory: Negative for cough and shortness of breath.    Cardiovascular: Negative for chest pain, claudication and leg swelling.   Gastrointestinal: Negative for abdominal pain,  constipation, diarrhea, heartburn and vomiting.   Genitourinary: Negative for dysuria and hematuria.   Musculoskeletal: Negative for joint pain and myalgias.   Skin: Negative for itching and rash.   Neurological: Negative for dizziness, sensory change, speech change, weakness and headaches.   Psychiatric/Behavioral: Negative for depression. The patient is not nervous/anxious and does not have insomnia.       Physical Exam  Laboratory/Imaging   Hemodynamics  Temp (24hrs), Av.3 °C (97.4 °F), Min:36.2 °C (97.1 °F), Max:36.4 °C (97.5 °F)   Temperature: 36.4 °C (97.5 °F)  Pulse  Av.6  Min: 47  Max: 107   Blood Pressure : 127/57      Respiratory      Respiration: 18, Pulse Oximetry: 92 %     Work Of Breathing / Effort: Mild  RUL Breath Sounds: Clear, RML Breath Sounds: Diminished, RLL Breath Sounds: Diminished, OBED Breath Sounds: Clear, LLL Breath Sounds: Diminished    Fluids    Intake/Output Summary (Last 24 hours) at 18 1922  Last data filed at 18 1800   Gross per 24 hour   Intake              258 ml   Output             1900 ml   Net            -1642 ml       Nutrition  Orders Placed This Encounter   Procedures   • DIET ORDER     Standing Status:   Standing     Number of Occurrences:   1     Order Specific Question:   Diet:     Answer:   Regular [1]     Physical Exam   Constitutional: He appears well-developed and well-nourished. No distress.   HENT:   Head: Normocephalic and atraumatic.   Eyes: Conjunctivae are normal. No scleral icterus.   Neck: Neck supple. No JVD present.   Cardiovascular: Normal rate and regular rhythm.  Exam reveals friction rub. Exam reveals no gallop.    No murmur heard.  Pulmonary/Chest: Effort normal and breath sounds normal. No respiratory distress. He has no wheezes. He exhibits no tenderness.   Abdominal: Soft. Bowel sounds are normal. He exhibits no distension and no mass. There is no tenderness.   Musculoskeletal: He exhibits no edema or tenderness.   Neurological:  He is alert. No cranial nerve deficit.   Patient pleasantly confused, oriented to self.   Skin: Skin is warm and dry. He is not diaphoretic. No erythema. No pallor.   Psychiatric: He has a normal mood and affect. His behavior is normal.   Nursing note and vitals reviewed.      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 Right hepatic metastasis, gastrohepatic ligament LN, T2, L3 osteolytic lesions, Left and Right rib lesions and, multiple pulmonary nodules. Complicated by VTE. Discussed palliative care and hospice as options, recommended that they discuss it with oncology as well.    - palliative radiation  - Palliative care RN  - Dexamethasone for cord compression until dc home        Acute hypoxemic respiratory failure (CMS-HCC)- (present on admission)   Assessment & Plan    PNA, continue management of infectious concerns. Elevated BNP / Pleural effusions noted  - completed cefdinir  - monitor clinically  - RT to follow        Cord compression (CMS-HCC)- (present on admission)   Assessment & Plan    At T2, seen on MRI. Previously discussed with neurosurg, no intervention at that time.  - Radiation oncology on board  - radiation treatment completed 4/5 T2, 3/5 T4/8 per radiation oncology notes - will complete radiation on Tuesday  - continue dexamethasone for now  Continue goals of care conversations - daughter wants to take patient  home with hospice          DVT (deep venous thrombosis) (CMS-HCC)- (present on admission)   Assessment & Plan    - continue heparin drip while in hospital - PTT stable        Pleural effusion- (present on admission)   Assessment & Plan    B/L. Likely para pneumonic vs malignant- small          Pneumonia- (present on admission)   Assessment & Plan    Completed treatment          Retention of urine- (present on admission)   Assessment & Plan    Diaz catheter placed d/t retention. 18f Coude          Pericardial effusion- (present on admission)   Assessment & Plan    Noted on echocardiogram and CT.   - Continue colchicine x6 weeks          Thoracic aortic aneurysm without rupture (CMS-HCC)- (present on admission)   Assessment & Plan    Outpatient surveillance         DNR (do not resuscitate)- (present on admission)   Assessment & Plan    Was Discussed with patient and daughter on 01/26/18  Likely home with hospice after completion of palliative radiation.          Leukocytosis- (present on admission)   Assessment & Plan    Steroid induced, given transition to hospice post radiation, will not continue to check CBC          UTI (urinary tract infection)- (present on admission)   Assessment & Plan    This is 2/2 chronic urinary retention and related to ISC at home. Recent culture with E.Coli. Complicated UTI  - s/p 5 day course          BPH (benign prostatic hyperplasia)- (present on admission)   Assessment & Plan    With urinary retention. H/o TURP  - tolerating increased flomax  diaz        CKD (chronic kidney disease) stage 3, GFR 30-59 ml/min- (present on admission)   Assessment & Plan     Avoid nephrotoxins and dose medications renally            Reviewed items::  Labs reviewed, Medications reviewed and Radiology images reviewed  Diaz catheter::  Urinary Tract Retention or Urinary Tract Obstruction  DVT prophylaxis pharmacological::  Heparin    [facility] Hospitalist Progress Note    Date of Service: 2/5/2018    Chief  Complaint  89 y.o. male admitted 2018 with metastatic lung cancer, PE on heparin, pericartitis with Pericardial effusion and dementia    Interval Problem Update   Patient transferred to oncology overnight, he is pleasantly confused but states he has no pain.  He remembers he is doing radiation daily because of cancer but doesn't know what else is going on and isn't able to answer questions with consistency.  Palliaitive care RN involved and has good rapport with daughter.  After patient completes radiation, she would like to take him home with her and have hospice assistance.  I've placed hospice order.     Patient sleeping much of the day and during evaluation.  He is tolerating radiation but not able to tell me if he thinks it is helping his pain, he denies pain when asked.  Planning for home hospice underway, will complete radiation treatments on Tuesday.    Consultants/Specialty  Oncology - Lynne  Rad-onc - Peddamaribel Smith - [place]    Disposition  Home with hospice post radiation.        Review of Systems   Unable to perform ROS: Mental status change      Physical Exam  Laboratory/Imaging   Hemodynamics  Temp (24hrs), Av.3 °C (97.4 °F), Min:36.2 °C (97.1 °F), Max:36.4 °C (97.5 °F)   Temperature: 36.4 °C (97.5 °F)  Pulse  Av.6  Min: 47  Max: 107   Blood Pressure : 127/57      Respiratory      Respiration: 18, Pulse Oximetry: 92 %     Work Of Breathing / Effort: Mild  RUL Breath Sounds: Clear, RML Breath Sounds: Diminished, RLL Breath Sounds: Diminished, OBED Breath Sounds: Clear, LLL Breath Sounds: Diminished    Fluids    Intake/Output Summary (Last 24 hours) at 18 192  Last data filed at 18 1800   Gross per 24 hour   Intake              258 ml   Output             1900 ml   Net            -1642 ml       Nutrition  Orders Placed This Encounter   Procedures   • DIET ORDER     Standing Status:   Standing     Number of Occurrences:   1     Order Specific Question:   Diet:     Answer:    "Regular [1]     Physical Exam   Constitutional: He appears well-developed and well-nourished. No distress.   HENT:   Head: Normocephalic and atraumatic.   Eyes: Conjunctivae are normal. No scleral icterus.   Neck: Neck supple. No JVD present.   Cardiovascular: Normal rate and regular rhythm.  Exam reveals friction rub. Exam reveals no gallop.    No murmur heard.  Pulmonary/Chest: Effort normal and breath sounds normal. No respiratory distress. He has no wheezes. He exhibits no tenderness.   Abdominal: Soft. Bowel sounds are normal. He exhibits no distension and no mass. There is no tenderness.   Musculoskeletal: He exhibits no edema or tenderness.   Neurological: He is alert. No cranial nerve deficit.   Patient pleasantly confused, answers questions with \"I'm note sure what's going on or where I'm at\"   Skin: Skin is warm and dry. He is not diaphoretic. No erythema. No pallor.   Psychiatric: He has a normal mood and affect. His behavior is normal.   Nursing note and vitals reviewed.      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 Right hepatic metastasis, gastrohepatic ligament LN, T2, L3 osteolytic lesions, Left and Right rib lesions and, multiple pulmonary nodules. Complicated by VTE. Discussed palliative care and hospice as options, recommended that they discuss it with oncology as well.    - palliative radiation  - Palliative care " RN  - Dexamethasone for cord compression until dc home        Acute hypoxemic respiratory failure (CMS-HCC)- (present on admission)   Assessment & Plan    PNA, continue management of infectious concerns. Elevated BNP / Pleural effusions noted  - completed cefdinir  - monitor clinically  - RT to follow        Cord compression (CMS-formerly [facility])- (present on admission)   Assessment & Plan    At T2, seen on MRI. Previously discussed with neurosurg, no intervention at that time.  - Radiation oncology on board  - radiation treatment completed 4/5 T2, 3/5 T4/8 per radiation oncology notes - will complete radiation on Tuesday  - continue dexamethasone for now  Continue goals of care conversations - daughter wants to take patient home with hospice          DVT (deep venous thrombosis) (CMS-HCC)- (present on admission)   Assessment & Plan    - continue heparin drip while in hospital - PTT stable        Pleural effusion- (present on admission)   Assessment & Plan    B/L. Likely para pneumonic vs malignant- small          Pneumonia- (present on admission)   Assessment & Plan    Completed treatment          Retention of urine- (present on admission)   Assessment & Plan    Dee catheter placed d/t retention. 18f Coude          Pericardial effusion- (present on admission)   Assessment & Plan    Noted on echocardiogram and CT.   - Continue colchicine x6 weeks          Thoracic aortic aneurysm without rupture (CMS-HCC)- (present on admission)   Assessment & Plan    Outpatient surveillance         DNR (do not resuscitate)- (present on admission)   Assessment & Plan    Was Discussed with patient and daughter on 01/26/18  Likely home with hospice after completion of palliative radiation.          Leukocytosis- (present on admission)   Assessment & Plan    Steroid induced, given transition to hospice post radiation, will not continue to check CBC          UTI (urinary tract infection)- (present on admission)   Assessment & Plan    This is 2/2  chronic urinary retention and related to ISC at home. Recent culture with E.Coli. Complicated UTI  - s/p 5 day course          BPH (benign prostatic hyperplasia)- (present on admission)   Assessment & Plan    With urinary retention. H/o TURP  - tolerating increased flomax  diaz        CKD (chronic kidney disease) stage 3, GFR 30-59 ml/min- (present on admission)   Assessment & Plan     Avoid nephrotoxins and dose medications renally            Reviewed items::  Labs reviewed, Medications reviewed and Radiology images reviewed  Diaz catheter::  Urinary Tract Retention or Urinary Tract Obstruction  DVT prophylaxis pharmacological::  Heparin

## 2018-02-06 NOTE — DISCHARGE PLANNING
Received transport forms from [facility] to [place]. Faxed transport forms to [facility].

## 2018-02-06 NOTE — PALLIATIVE CARE
PALLIATIVE CARE FOLLOW UP:  No family at bedside this morning. Patient confused. Call placed to patient's daughter/DPOA-HC Amie. Discussed POLST form (completed for DNR, CMO, no feeding tube, no IVF). Amie agreeable to sign as patient's agent. Stated I would leave on hard chart and discuss with RN. Amie requested dialogue regarding patient's condition prior to discharge so she, other family members, and paid care givers can take the best care of patient. Advocated she discuss with RN to facilitate speaking with MD when she arrives. Amie encouraged to call with any questions, needs or concerns.     Thank you for allowing Palliative Care to follow this patient. Please contact us at  with any questions.

## 2018-02-06 NOTE — PROGRESS NOTES
Awaiting Amie, daughter, to arrive to sign paperwork. Pt being discharged home with emily and ok to stop the heparin gtt per hospice and Dr. Cummings.

## 2018-02-06 NOTE — DISCHARGE INSTRUCTIONS
Discharge Instructions    Discharged to home by medical transportation with relative. Discharged via ambulance, hospital escort: Refused.  Special equipment needed: Oxygen    Be sure to schedule a follow-up appointment with your primary care doctor or any specialists as instructed.     Discharge Plan:   Diet Plan: Discussed  Activity Level: Discussed  Smoking Cessation Offered: Patient Refused  Confirmed Follow up Appointment: Patient to Call and Schedule Appointment  Confirmed Symptoms Management: Discussed  Medication Reconciliation Updated: Yes  Influenza Vaccine Indication: Patient Refuses    I understand that a diet low in cholesterol, fat, and sodium is recommended for good health. Unless I have been given specific instructions below for another diet, I accept this instruction as my diet prescription.   Other diet: Regular    Special Instructions: None    · Is patient discharged on Warfarin / Coumadin?   No     Dee Catheter Care, Adult  A Dee catheter is a soft, flexible tube that is placed into the bladder to drain urine. A Dee catheter may be inserted if:  · You leak urine or are not able to control when you urinate (urinary incontinence).  · You are not able to urinate when you need to (urinary retention).  · You had prostate surgery or surgery on the genitals.  · You have certain medical conditions, such as multiple sclerosis, dementia, or a spinal cord injury.  If you are going home with a Dee catheter in place, follow the instructions below.  TAKING CARE OF THE CATHETER  1. Wash your hands with soap and water.  2. Using mild soap and warm water on a clean washcloth:  ¨ Clean the area on your body closest to the catheter insertion site using a circular motion, moving away from the catheter. Never wipe toward the catheter because this could sweep bacteria up into the urethra and cause infection.  ¨ Remove all traces of soap. Pat the area dry with a clean towel. For males, reposition the  foreskin.  3. Attach the catheter to your leg so there is no tension on the catheter. Use adhesive tape or a leg strap. If you are using adhesive tape, remove any sticky residue left behind by the previous tape you used.  4. Keep the drainage bag below the level of the bladder, but keep it off the floor.  5. Check throughout the day to be sure the catheter is working and urine is draining freely. Make sure the tubing does not become kinked.  6. Do not pull on the catheter or try to remove it. Pulling could damage internal tissues.  TAKING CARE OF THE DRAINAGE BAGS  You will be given two drainage bags to take home. One is a large overnight drainage bag, and the other is a smaller leg bag that fits underneath clothing. You may wear the overnight bag at any time, but you should never wear the smaller leg bag at night. Follow the instructions below for how to empty, change, and clean your drainage bags.  Emptying the Drainage Bag  You must empty your drainage bag when it is  -½ full or at least 2-3 times a day.  1. Wash your hands with soap and water.  2. Keep the drainage bag below your hips, below the level of your bladder. This stops urine from going back into the tubing and into your bladder.  3. Hold the dirty bag over the toilet or a clean container.  4. Open the pour spout at the bottom of the bag and empty the urine into the toilet or container. Do not let the pour spout touch the toilet, container, or any other surface. Doing so can place bacteria on the bag, which can cause an infection.  5. Clean the pour spout with a gauze pad or cotton ball that has rubbing alcohol on it.  6. Close the pour spout.  7. Attach the bag to your leg with adhesive tape or a leg strap.  8. Wash your hands well.  Changing the Drainage Bag  Change your drainage bag once a month or sooner if it starts to smell bad or look dirty. Below are steps to follow when changing the drainage bag.  2. Wash your hands with soap and  water.  3. Pinch off the rubber catheter so that urine does not spill out.  4. Disconnect the catheter tube from the drainage tube at the connection valve. Do not let the tubes touch any surface.  5. Clean the end of the catheter tube with an alcohol wipe. Use a different alcohol wipe to clean the end of the drainage tube.  6. Connect the catheter tube to the drainage tube of the clean drainage bag.  7. Attach the new bag to the leg with adhesive tape or a leg strap. Avoid attaching the new bag too tightly.  8. Wash your hands well.  Cleaning the Drainage Bag  1. Wash your hands with soap and water.  2. Wash the bag in warm, soapy water.  3. Rinse the bag thoroughly with warm water.  4. Fill the bag with a solution of white vinegar and water (1 cup vinegar to 1 qt warm water [.2 L vinegar to 1 L warm water]). Close the bag and soak it for 30 minutes in the solution.  5. Rinse the bag with warm water.  6. Hang the bag to dry with the pour spout open and hanging downward.  7. Store the clean bag (once it is dry) in a clean plastic bag.  8. Wash your hands well.  PREVENTING INFECTION  · Wash your hands before and after handling your catheter.  · Take showers daily and wash the area where the catheter enters your body. Do not take baths. Replace wet leg straps with dry ones, if this applies.  · Do not use powders, sprays, or lotions on the genital area. Only use creams, lotions, or ointments as directed by your caregiver.  · For females, wipe from front to back after each bowel movement.  · Drink enough fluids to keep your urine clear or pale yellow unless you have a fluid restriction.  · Do not let the drainage bag or tubing touch or lie on the floor.  · Wear cotton underwear to absorb moisture and to keep your .  SEEK MEDICAL CARE IF:   · Your urine is cloudy or smells unusually bad.  · Your catheter becomes clogged.  · You are not draining urine into the bag or your bladder feels full.  · Your catheter  starts to leak.  SEEK IMMEDIATE MEDICAL CARE IF:   · You have pain, swelling, redness, or pus where the catheter enters the body.  · You have pain in the abdomen, legs, lower back, or bladder.  · You have a fever.  · You see blood fill the catheter, or your urine is pink or red.  · You have nausea, vomiting, or chills.  · Your catheter gets pulled out.  MAKE SURE YOU:   · Understand these instructions.  · Will watch your condition.  · Will get help right away if you are not doing well or get worse.     This information is not intended to replace advice given to you by your health care provider. Make sure you discuss any questions you have with your health care provider.     Document Released: 12/18/2006 Document Revised: 05/04/2015 Document Reviewed: 12/09/2013  [employer] Interactive Patient Education ©2016 [employer]      Hospice  Hospice is a service that is designed to provide people who are terminally ill and their families with medical, spiritual, and psychological support. Its aim is to improve your quality of life by keeping you as alert and comfortable as possible. Hospice is performed by a team of health care professionals and volunteers who:  · Help keep you comfortable. Hospice can be provided in your home or in a homelike setting. The hospice staff works with your family and friends to help meet your needs. You will enjoy the support of loved ones by receiving much of your basic care from family and friends.  · Provide pain relief and manage your symptoms. The staff supply all necessary medicines and equipment.  · Provide companionship when you are alone.  · Allow you and your family to rest. They may do light housekeeping, prepare meals, and run errands.  · Provide counseling. They will make sure your emotional, spiritual, and social needs and those of your family are being met.  · Provide spiritual care. Spiritual care is individualized to meet your needs and your family's needs. It may involve  helping you look at what death means to you, say goodbye, or perform a specific Holiness ceremony or ritual.  Hospice teams often include:  · A nurse.  · A doctor.  · Social workers.  · Tenriism leaders (such as a ).  · Trained volunteers.  WHEN SHOULD HOSPICE CARE BEGIN?  Most people who use hospice are believed to have fewer than 6 months to live. Your family and health care providers can help you decide when hospice services should begin. If your condition improves, you may discontinue the program.  WHAT SHOULD I CONSIDER BEFORE SELECTING A PROGRAM?  Most hospice programs are run by nonprofit, independent organizations. Some are affiliated with hospitals, nursing homes, or home health care agencies. Hospice programs can take place in the home or at a hospice center, hospital, or skilled nursing facility. When choosing a hospice program, ask the following questions:  · What services are available to me?  · What services are offered to my loved ones?  · How involved are my loved ones?  · How involved is my health care provider?  · Who makes up the hospice care team? How are they trained or screened?  · How will my pain and symptoms be managed?  · If my circumstances change, can the services be provided in a different setting, such as my home or in the hospital?  · Is the program reviewed and licensed by the state or certified in some other way?  WHERE CAN I LEARN MORE ABOUT HOSPICE?  You can learn about existing hospice programs in your area from your health care providers. You can also read more about hospice online. The websites of the following organizations contain helpful information:  · The National Hospice and Palliative Care Organization (NHPCO).  · The Hospice Association of [place] (HAA).  · The Hospice Education [place].  · The American Cancer Society (ACS).  · Hospice Net.     This information is not intended to replace advice given to you by your health care provider. Make sure you discuss  any questions you have with your health care provider.     Document Released: 04/05/2005 Document Revised: 12/23/2014 Document Reviewed: 10/28/2014  [employer] Interactive Patient Education ©2016 [employer]      Depression / Suicide Risk    As you are discharged from this [facility] facility, it is important to learn how to keep safe from harming yourself.    Recognize the warning signs:  · Abrupt changes in personality, positive or negative- including increase in energy   · Giving away possessions  · Change in eating patterns- significant weight changes-  positive or negative  · Change in sleeping patterns- unable to sleep or sleeping all the time   · Unwillingness or inability to communicate  · Depression  · Unusual sadness, discouragement and loneliness  · Talk of wanting to die  · Neglect of personal appearance   · Rebelliousness- reckless behavior  · Withdrawal from people/activities they love  · Confusion- inability to concentrate     If you or a loved one observes any of these behaviors or has concerns about self-harm, here's what you can do:  · Talk about it- your feelings and reasons for harming yourself  · Remove any means that you might use to hurt yourself (examples: pills, rope, extension cords, firearm)  · Get professional help from the community (Mental Health, Substance Abuse, psychological counseling)  · Do not be alone:Call your Safe Contact- someone whom you trust who will be there for you.  · Call your local CRISIS HOTLINE 516-2115 or 429-704-7970  · Call your local Children's Mobile Crisis Response Team [place] (034) 764-2398 or www.Sooqini  · Call the toll free National Suicide Prevention Hotlines   · National Suicide Prevention Lifeline 779-799-AIZM (4584)  · National Hope Line Network 800-SUICIDE (906-7158)

## 2018-02-06 NOTE — DISCHARGE PLANNING
Staffed with [facility] hospice RN Zoe on 2/5/18 who advised they have accepted pt and plan is for pt to d/c to his daughter Amie's home on 2/6/2018.  Amie's address:  [address] [place] [address], [place] [address].    Spoke with hospice RN Zoe this AM who confirmed plan is for d/c to daughter's home this date following pt's final round of radiation.  Dr. Sanchez will come by for face to face.

## 2018-02-06 NOTE — DISCHARGE PLANNING
[facility] Notified JANETTE Avendaño via voicemail.

## 2018-06-06 NOTE — PROGRESS NOTES
[facility] Hospitalist Progress Note    Date of Service: 2/4/2018    Chief Complaint  89 y.o. male admitted 1/25/2018 with metastatic lung cancer, PE on heparin, pericartitis with Pericardial effusion and dementia    Interval Problem Update  2/1 Patient transferred to oncology overnight, he is pleasantly confused but states he has no pain.  He remembers he is doing radiation daily because of cancer but doesn't know what else is going on and isn't able to answer questions with consistency.  Palliaitive care RN involved and has good rapport with daughter.  After patient completes radiation, she would like to take him home with her and have hospice assistance.  I've placed hospice order.    2/2 Patient sleeping much of the day and during evaluation.  He is tolerating radiation but not able to tell me if he thinks it is helping his pain, he denies pain when asked.  Planning for home hospice underway, will complete radiation treatments on Tuesday.  2/3 Patient states he feels tired today, many visitors this am.  He would like the lights turned out so he can nap.  No acute complaints.  Radiation to resume on Monday.  2/4 Patient with family visiting again and in good spirits.  He had urinary retention overnight and required coude placement to help alleviate.  He states he feels much better when evaluated.    Consultants/Specialty  Oncology - Lynne  Rad-onc - Peddada  Cards - Crow    Disposition  Home with hospice post radiation.        Review of Systems   Constitutional: Negative for chills and fever.   HENT: Negative for congestion.    Eyes: Negative for blurred vision and photophobia.   Respiratory: Negative for cough and shortness of breath.    Cardiovascular: Negative for chest pain, claudication and leg swelling.   Gastrointestinal: Negative for abdominal pain, constipation, diarrhea, heartburn and vomiting.   Genitourinary: Negative for dysuria and hematuria.   Musculoskeletal: Negative for joint pain and myalgias.    Skin: Negative for itching and rash.   Neurological: Negative for dizziness, sensory change, speech change, weakness and headaches.   Psychiatric/Behavioral: Negative for depression. The patient is not nervous/anxious and does not have insomnia.       Physical Exam  Laboratory/Imaging   Hemodynamics  Temp (24hrs), Av.6 °C (97.8 °F), Min:36.4 °C (97.6 °F), Max:36.6 °C (97.9 °F)   Temperature: 36.4 °C (97.6 °F)  Pulse  Av.7  Min: 55  Max: 107   Blood Pressure : 122/74      Respiratory      Respiration: 17, Pulse Oximetry: 91 %     Work Of Breathing / Effort: Mild  RUL Breath Sounds: Clear, RML Breath Sounds: Diminished, RLL Breath Sounds: Diminished, OBED Breath Sounds: Clear, LLL Breath Sounds: Diminished    Fluids    Intake/Output Summary (Last 24 hours) at 18 1514  Last data filed at 18 2100   Gross per 24 hour   Intake              674 ml   Output              700 ml   Net              -26 ml       Nutrition  Orders Placed This Encounter   Procedures   • DIET ORDER     Standing Status:   Standing     Number of Occurrences:   1     Order Specific Question:   Diet:     Answer:   Regular [1]     Physical Exam   Constitutional: He appears well-developed and well-nourished. No distress.   HENT:   Head: Normocephalic and atraumatic.   Eyes: Conjunctivae are normal. No scleral icterus.   Neck: Neck supple. No JVD present.   Cardiovascular: Normal rate and regular rhythm.  Exam reveals friction rub. Exam reveals no gallop.    No murmur heard.  Pulmonary/Chest: Effort normal and breath sounds normal. No respiratory distress. He has no wheezes. He exhibits no tenderness.   Abdominal: Soft. Bowel sounds are normal. He exhibits no distension and no mass. There is no tenderness.   Musculoskeletal: He exhibits no edema or tenderness.   Neurological: He is alert. No cranial nerve deficit.   Patient pleasantly confused, oriented to self.   Skin: Skin is warm and dry. He is not diaphoretic. No erythema. No  pallor.   Psychiatric: He has a normal mood and affect. His behavior is normal.   Nursing note and vitals reviewed.      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 Right hepatic metastasis, gastrohepatic ligament LN, T2, L3 osteolytic lesions, Left and Right rib lesions and, multiple pulmonary nodules. Complicated by VTE. Discussed palliative care and hospice as options, recommended that they discuss it with oncology as well.    - palliative radiation  - Palliative care RN  - Dexamethasone for cord compression until dc home        Acute hypoxemic respiratory failure (CMS-HCC)- (present on admission)   Assessment & Plan    PNA, continue management of infectious concerns. Elevated BNP / Pleural effusions noted  - completed cefdinir  - monitor clinically  - RT to follow        Cord compression (CMS-[facility])- (present on admission)   Assessment & Plan    At T2, seen on MRI. Previously discussed with neurosurg, no intervention at that time.  - Radiation oncology on board  - radiation treatment completed 4/5 T2, 3/5 T4/8 per radiation oncology notes - will complete radiation on Tuesday  - continue dexamethasone for now  Continue goals of care conversations - daughter wants to take patient home with hospice          DVT (deep venous thrombosis) (CMS-[facility])- (present on admission)   Assessment & Plan    - continue heparin drip while in hospital -  PTT stable        Pleural effusion- (present on admission)   Assessment & Plan    B/L. Likely para pneumonic vs malignant- small          Pneumonia- (present on admission)   Assessment & Plan    Completed treatment          Retention of urine- (present on admission)   Assessment & Plan    Diaz catheter placed d/t retention. 18f Coude          Pericardial effusion- (present on admission)   Assessment & Plan    Noted on echocardiogram and CT.   - Continue colchicine x6 weeks          Thoracic aortic aneurysm without rupture (CMS-HCC)- (present on admission)   Assessment & Plan    Outpatient surveillance         DNR (do not resuscitate)- (present on admission)   Assessment & Plan    Was Discussed with patient and daughter on 01/26/18  Likely home with hospice after completion of palliative radiation.          Leukocytosis- (present on admission)   Assessment & Plan    Steroid induced, given transition to hospice post radiation, will not continue to check CBC          UTI (urinary tract infection)- (present on admission)   Assessment & Plan    This is 2/2 chronic urinary retention and related to ISC at home. Recent culture with E.Coli. Complicated UTI  - s/p 5 day course          BPH (benign prostatic hyperplasia)- (present on admission)   Assessment & Plan    With urinary retention. H/o TURP  - tolerating increased flomax  diaz        CKD (chronic kidney disease) stage 3, GFR 30-59 ml/min- (present on admission)   Assessment & Plan     Avoid nephrotoxins and dose medications renally            Reviewed items::  Labs reviewed, Medications reviewed and Radiology images reviewed  Diaz catheter::  Urinary Tract Retention or Urinary Tract Obstruction  DVT prophylaxis pharmacological::  Heparin    [facility] Hospitalist Progress Note    Date of Service: 2/4/2018    Chief Complaint  89 y.o. male admitted 1/25/2018 with metastatic lung cancer, PE on heparin, pericartitis with Pericardial effusion and dementia    Interval  Problem Update   Patient transferred to oncology overnight, he is pleasantly confused but states he has no pain.  He remembers he is doing radiation daily because of cancer but doesn't know what else is going on and isn't able to answer questions with consistency.  Palliaitive care RN involved and has good rapport with daughter.  After patient completes radiation, she would like to take him home with her and have hospice assistance.  I've placed hospice order.     Patient sleeping much of the day and during evaluation.  He is tolerating radiation but not able to tell me if he thinks it is helping his pain, he denies pain when asked.  Planning for home hospice underway, will complete radiation treatments on Tuesday.    Consultants/Specialty  Oncology - Lynne  Rad-onc - Jennifer Smith - Crow    Disposition  Home with hospice post radiation.        Review of Systems   Unable to perform ROS: Mental status change      Physical Exam  Laboratory/Imaging   Hemodynamics  Temp (24hrs), Av.6 °C (97.8 °F), Min:36.4 °C (97.6 °F), Max:36.6 °C (97.9 °F)   Temperature: 36.4 °C (97.6 °F)  Pulse  Av.7  Min: 55  Max: 107   Blood Pressure : 122/74      Respiratory      Respiration: 17, Pulse Oximetry: 91 %     Work Of Breathing / Effort: Mild  RUL Breath Sounds: Clear, RML Breath Sounds: Diminished, RLL Breath Sounds: Diminished, OBED Breath Sounds: Clear, LLL Breath Sounds: Diminished    Fluids    Intake/Output Summary (Last 24 hours) at 18 1514  Last data filed at 18 2100   Gross per 24 hour   Intake              674 ml   Output              700 ml   Net              -26 ml       Nutrition  Orders Placed This Encounter   Procedures   • DIET ORDER     Standing Status:   Standing     Number of Occurrences:   1     Order Specific Question:   Diet:     Answer:   Regular [1]     Physical Exam   Constitutional: He appears well-developed and well-nourished. No distress.   HENT:   Head: Normocephalic and  "atraumatic.   Eyes: Conjunctivae are normal. No scleral icterus.   Neck: Neck supple. No JVD present.   Cardiovascular: Normal rate and regular rhythm.  Exam reveals friction rub. Exam reveals no gallop.    No murmur heard.  Pulmonary/Chest: Effort normal and breath sounds normal. No respiratory distress. He has no wheezes. He exhibits no tenderness.   Abdominal: Soft. Bowel sounds are normal. He exhibits no distension and no mass. There is no tenderness.   Musculoskeletal: He exhibits no edema or tenderness.   Neurological: He is alert. No cranial nerve deficit.   Patient pleasantly confused, answers questions with \"I'm note sure what's going on or where I'm at\"   Skin: Skin is warm and dry. He is not diaphoretic. No erythema. No pallor.   Psychiatric: He has a normal mood and affect. His behavior is normal.   Nursing note and vitals reviewed.      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 Right hepatic metastasis, gastrohepatic ligament LN, T2, L3 osteolytic lesions, Left and Right rib lesions and, multiple pulmonary nodules. Complicated by VTE. Discussed palliative care and hospice as options, recommended that they discuss it with oncology as well.    - palliative radiation  - Palliative care RN  - Dexamethasone for cord compression until dc home        Acute hypoxemic respiratory failure (CMS-HCC)- (present on admission) "   Assessment & Plan    PNA, continue management of infectious concerns. Elevated BNP / Pleural effusions noted  - completed cefdinir  - monitor clinically  - RT to follow        Cord compression (CMS-HCC)- (present on admission)   Assessment & Plan    At T2, seen on MRI. Previously discussed with neurosurg, no intervention at that time.  - Radiation oncology on board  - radiation treatment completed 4/5 T2, 3/5 T4/8 per radiation oncology notes - will complete radiation on Tuesday  - continue dexamethasone for now  Continue goals of care conversations - daughter wants to take patient home with hospice          DVT (deep venous thrombosis) (CMS-HCC)- (present on admission)   Assessment & Plan    - continue heparin drip while in hospital - PTT stable        Pleural effusion- (present on admission)   Assessment & Plan    B/L. Likely para pneumonic vs malignant- small          Pneumonia- (present on admission)   Assessment & Plan    Completed treatment          Retention of urine- (present on admission)   Assessment & Plan    Dee catheter placed d/t retention. 18f Coude          Pericardial effusion- (present on admission)   Assessment & Plan    Noted on echocardiogram and CT.   - Continue colchicine x6 weeks          Thoracic aortic aneurysm without rupture (CMS-HCC)- (present on admission)   Assessment & Plan    Outpatient surveillance         DNR (do not resuscitate)- (present on admission)   Assessment & Plan    Was Discussed with patient and daughter on 01/26/18  Likely home with hospice after completion of palliative radiation.          Leukocytosis- (present on admission)   Assessment & Plan    Steroid induced, given transition to hospice post radiation, will not continue to check CBC          UTI (urinary tract infection)- (present on admission)   Assessment & Plan    This is 2/2 chronic urinary retention and related to ISC at home. Recent culture with E.Coli. Complicated UTI  - s/p 5 day course          BPH  (benign prostatic hyperplasia)- (present on admission)   Assessment & Plan    With urinary retention. H/o TURP  - tolerating increased flomax  diaz        CKD (chronic kidney disease) stage 3, GFR 30-59 ml/min- (present on admission)   Assessment & Plan     Avoid nephrotoxins and dose medications renally            Reviewed items::  Labs reviewed, Medications reviewed and Radiology images reviewed  Diaz catheter::  Urinary Tract Retention or Urinary Tract Obstruction  DVT prophylaxis pharmacological::  Heparin             DVT prophylaxis

## 2022-05-16 NOTE — ASSESSMENT & PLAN NOTE
Avoid nephrotoxins and dose medications renally  
- continue heparin drip while in hospital - PTT stable  
At T2, seen on MRI. Previously discussed with neurosurg, no intervention at that time.  - Radiation oncology on board  - radiation treatment completed 4/5 T2, 3/5 T4/8 per radiation oncology notes - will complete radiation on Tuesday  - continue dexamethasone for now  Continue goals of care conversations - daughter wants to take patient home with hospice    
B/L. Likely para pneumonic vs malignant- small    
Chronic  
Clinically. No CTA performed. Treating as acute B/L PE, given b/l pleuritic pain.   Diagnostic evaluation will not change underlying management at this time  - continue IV heparin, monitor APTT, CBC  - Consider transitioning to SC lovenox over the coming days if no procedures are planned    
Completed treatment    
Dee catheter placed d/t retention. 18f Coude    
Metastatic disease with likely primary source being lung, based on pathology. Right hepatic metastasis, gastrohepatic ligament LN, T2, L3 osteolytic lesions, Left and Right rib lesions and, multiple pulmonary nodules. Complicated by VTE. Discussed palliative care and hospice as options, recommended that they discuss it with oncology as well.    - palliative radiation  - Palliative care RN  - Dexamethasone for cord compression until dc home  
No injury noted on Imaging evaluation  PT/OT evaluation  Post acute placement as clinically appropriate  
Noted on CXRAY  CT chest negative for these  Pending SPEP / UPEP  Underlying metastatic malignancy is seen  Whole body bone scan requested by me  
Noted on echocardiogram and CT.   - Continue colchicine x6 weeks    
Outpatient surveillance   
PE / Pericarditis related  Evaluated by cardiology this hospital stay  
PNA, continue management of infectious concerns. Elevated BNP / Pleural effusions noted  - completed cefdinir  - monitor clinically  - RT to follow  
Pleuritic in etiology  Related to pneumonia, PE and pericarditis  Continue Colchicine  Continue management of pneumonia / BD regimen / RT care  Continue anticoagulation  
Related to acute PE  Post acute placement as clinically appropriate  
Steroid induced, given transition to hospice post radiation, will not continue to check CBC    
These may be malignant  Outpatient surveillance per PCP/Oncology team recommended  
This is 2/2 chronic urinary retention and related to ISC at home. Recent culture with E.Coli. Complicated UTI  - s/p 5 day course    
Was Discussed with patient and daughter on 01/26/18  Likely home with hospice after completion of palliative radiation.    
With underlying pericardial effusion and chest pain. Underlying malignant etiologies cannot be ruled out at this time  - Cardiology has seen and evaluated, greatly appreciate  - colchicine x6 weeks  - monitor clinically  
With urinary retention. H/o TURP  - tolerating increased flomax  diaz  
08:08

## 2024-08-07 NOTE — ED NOTES
Med rec complete per pt and family  Allergies reviewed  Per family Pt finished a week long antibiotic for UTI about 3 weeks ago,   Pt advised

## 2024-10-10 NOTE — H&P
DATE OF ADMISSION:  1/25/2018    IDENTIFICATION:  This is a 89-year-old male.    PRIMARY CARE PHYSICIAN:  Appears to be a NP JUDY Zurita,  had recently seen the patient.    CHIEF COMPLAINT:  Recent UTI, falls, left hip, left throat pain, and cough.    HISTORY OF PRESENT ILLNESS:  This is a 89-year-old male with sole history of   benign prostatic hypertrophy.  Apparently, he has not been seen by doctors   much.  He took a fall approximately 2 days ago.  Recently, he was treated for   a urinary tract infection.  During the fall, he apparently injured his left   hip and left ribs.  He was developing a cough which is productive yellow and   green sputum.  He lost his appetite and was significantly weak, brought into   the emergency room, seen by the ER physician here and diagnosed with   pneumonia, likely.  The patient with some oxygen requirement here, in pain and   with acute kidney injury, to be admitted and further evaluated.  The patient   is an overall fair historian.  He is here with his daughter.    ALLERGIES:  PENICILLIN AND SULFA DRUGS.    MEDICATION REGIMEN:  On an outpatient basis Flomax 0.4 mg daily.    PAST MEDICAL HISTORY:  1.  UTI.  2.  BPH.  3.  Question of chronic kidney disease.    PAST SURGICAL HISTORY:  History of TURP in 2013, history of carpal tunnel   release, and rotator cuff surgery.    SOCIAL HISTORY:  The patient is nonsmoker, nondrinker.  He is .    FAMILY HISTORY:  Per his report is negative.    REVIEW OF SYSTEMS:  Negative for AMA and CMS criteria other than outlined in   HPI.  He did lose his appetite recently.  Apparently, he has not lost   significant weight, but has been having low energy.    PHYSICAL EXAMINATION:  VITAL SIGNS:  The patient is found to have temperature 37.9, pulse 80,   respiration 25, blood pressure 119/67.  The patient is saturating 93% on low   flow oxygen.  GENERAL:  This is a pleasant  male in no acute distress, no acute   respiratory distress.   Well developed, well nourished.  HEENT:  Normocephalic, atraumatic.  EOMI.  PERRLA.  Mucous membranes are   moist.  Nasopharynx otherwise clear.  NECK:  Stiff range of motion.  No lymphadenopathy or thyromegaly.  CHEST:  Normal bony structures.  LUNGS:  Clear to auscultation.  Anterior and posterior bibasilar rales and   rhonchi.  HEART:  There is arrhythmia with frequent extra beats.  No S3 or S4.  Distant   heart sounds.  ABDOMEN:  Protuberant.  There is tenderness over the left chest wall and left   hip with good range of motion.  GENITOURINARY:  Normal external male genitalia.  RECTAL:  Exam is deferred.  MUSCULOSKELETAL:  No clubbing, cyanosis, or edema.  NEUROLOGIC:  The patient is alert and oriented x4.  Cranial nerves II-XII are   grossly intact.  No sensory loss is elicited.    LABORATORY DATA AND IMAGING:  EKG shows a sinus rhythm at a rate of 80 with   left bundle-branch block, some frequent extra beats noted.  White cell count   is 16.6, hemoglobin 13.1, hematocrit 40.8, platelet count 256, neutrophils 84.    Sodium is 136, potassium 4, chloride 105, bicarbonate 22, glucose 106, BUN   is 31, creatinine 1.56.  His lactic acid was negative here.  Recent urinalysis   is slightly positive.  There was a positive urine culture that resolves mixed   enteric sadiq E. coli, fairly pansensitive.  At that time, chest x-ray today   shows diffuse interstitial prominence, could relate to mild edema, viral   infection, patchy airspace opacity in bilateral lower lobes, left more than   right atelectasis versus consolidation.    ASSESSMENT AND PLAN:  1.  Likely community-acquired pneumonia.  The patient is an elderly male who   recently had some increased weakness and urinary tract infection.  Patient at   this time will be admitted with IV fluids, IV antibiotics, and pneumonia care.  2.  Status post fall with apparently hitting his head, not hard, though he   states left-sided rib, left-sided hip pain.  There was no  Imaging done in the   ER, although we will follow up on the CT of the head.  Left rib series and   left hip x-ray, pelvis x-ray to rule out fracture or significant injury.  The   patient will be also evaluated for possible syncope.  He cannot tell me how   those falls happened.  We will check an echocardiogram and tele monitoring as   well as carotid ultrasound and orthostatic blood pressures.  3.  Benign prostatic hypertrophy.  Continue Flomax.  4.  Recent urinary tract infection, seems to have resolved.  5.  Acute kidney injury, question of baseline.  Patient will be hydrated and   followed closely.  6.  Leukocytosis.  7.  Overall poor database.    PLAN:  The patient at this time will be admitted for pneumonia, status post   fall with syncope workup.  Patient will likely require 2+ overnight stay.  He   is significantly ill, medically complex.       ____________________________________     MD CROW MAHONEY / LONNY    DD:  01/25/2018 18:08:08  DT:  01/25/2018 19:37:59    D#:  [id number]  Job#:  [id number]     2 bouts -- 3 lateral steps along EOB, ~3ft bed to chair